# Patient Record
Sex: FEMALE | Race: BLACK OR AFRICAN AMERICAN | Employment: OTHER | URBAN - METROPOLITAN AREA
[De-identification: names, ages, dates, MRNs, and addresses within clinical notes are randomized per-mention and may not be internally consistent; named-entity substitution may affect disease eponyms.]

---

## 2017-11-27 ENCOUNTER — HOSPITAL ENCOUNTER (OUTPATIENT)
Dept: LAB | Age: 72
Discharge: HOME | End: 2017-11-27
Payer: MEDICARE

## 2017-11-27 DIAGNOSIS — I25.119 CORONARY ARTERY DISEASE INVOLVING NATIVE CORONARY ARTERY OF NATIVE HEART WITH ANGINA PECTORIS (HCC): ICD-10-CM

## 2017-11-27 PROBLEM — E78.2 HYPERLIPEMIA, MIXED: Status: ACTIVE | Noted: 2017-11-27

## 2017-11-27 PROBLEM — J43.8 OTHER EMPHYSEMA (HCC): Status: ACTIVE | Noted: 2017-11-27

## 2017-11-27 PROBLEM — M32.8 OTHER FORMS OF SYSTEMIC LUPUS ERYTHEMATOSUS (HCC): Status: ACTIVE | Noted: 2017-11-27

## 2017-11-27 PROBLEM — I10 ESSENTIAL HYPERTENSION: Status: ACTIVE | Noted: 2017-11-27

## 2017-11-27 PROBLEM — Z72.0 TOBACCO ABUSE: Status: ACTIVE | Noted: 2017-11-27

## 2017-11-27 LAB
ANION GAP SERPL CALC-SCNC: 6 MMOL/L
BASOPHILS # BLD: 0 K/UL (ref 0–0.2)
BASOPHILS NFR BLD: 0 % (ref 0–2)
BUN SERPL-MCNC: 13 MG/DL (ref 8–23)
CALCIUM SERPL-MCNC: 9 MG/DL (ref 8.3–10.4)
CHLORIDE SERPL-SCNC: 106 MMOL/L (ref 98–107)
CO2 SERPL-SCNC: 28 MMOL/L (ref 21–32)
CREAT SERPL-MCNC: 1.1 MG/DL (ref 0.6–1)
DIFFERENTIAL METHOD BLD: ABNORMAL
EOSINOPHIL # BLD: 0.1 K/UL (ref 0–0.8)
EOSINOPHIL NFR BLD: 1 % (ref 0.5–7.8)
ERYTHROCYTE [DISTWIDTH] IN BLOOD BY AUTOMATED COUNT: 15.3 % (ref 11.9–14.6)
GLUCOSE SERPL-MCNC: 84 MG/DL (ref 65–100)
HCT VFR BLD AUTO: 48.9 % (ref 35.8–46.3)
HGB BLD-MCNC: 16.2 G/DL (ref 11.7–15.4)
INR PPP: 1.1 (ref 0.9–1.2)
LYMPHOCYTES # BLD: 3.4 K/UL (ref 0.5–4.6)
LYMPHOCYTES NFR BLD: 35 % (ref 13–44)
MCH RBC QN AUTO: 31.6 PG (ref 26.1–32.9)
MCHC RBC AUTO-ENTMCNC: 33.1 G/DL (ref 31.4–35)
MCV RBC AUTO: 95.5 FL (ref 79.6–97.8)
MONOCYTES # BLD: 0.7 K/UL (ref 0.1–1.3)
MONOCYTES NFR BLD: 8 % (ref 4–12)
NEUTS SEG # BLD: 5.5 K/UL (ref 1.7–8.2)
NEUTS SEG NFR BLD: 56 % (ref 43–78)
PLATELET # BLD AUTO: 236 K/UL (ref 150–450)
PMV BLD AUTO: 10.7 FL (ref 10.8–14.1)
POTASSIUM SERPL-SCNC: 4.3 MMOL/L (ref 3.5–5.1)
PROTHROMBIN TIME: 11.6 SEC (ref 9.6–12)
RBC # BLD AUTO: 5.12 M/UL (ref 4.05–5.25)
SODIUM SERPL-SCNC: 140 MMOL/L (ref 136–145)
WBC # BLD AUTO: 9.7 K/UL (ref 4.3–11.1)

## 2017-11-27 PROCEDURE — 36415 COLL VENOUS BLD VENIPUNCTURE: CPT | Performed by: INTERNAL MEDICINE

## 2017-11-27 PROCEDURE — 85025 COMPLETE CBC W/AUTO DIFF WBC: CPT | Performed by: INTERNAL MEDICINE

## 2017-11-27 PROCEDURE — 80048 BASIC METABOLIC PNL TOTAL CA: CPT | Performed by: INTERNAL MEDICINE

## 2017-11-27 PROCEDURE — 85610 PROTHROMBIN TIME: CPT | Performed by: INTERNAL MEDICINE

## 2017-11-28 NOTE — PROGRESS NOTES
Patient pre-assessment complete for [facility] poss with Dr Rylan Herrera scheduled for 17 at 9:30am, arrival time 7:30am. Patient verified using . Patient instructed to bring all home medications in labeled bottles on the day of procedure. NPO status reinforced. Patient informed to take a full dose aspirin 325mg  or 81 mg x 4 on the day of procedure. Instructed they can take all other medications excluding vitamins & supplements. Patient verbalizes understanding of all instructions & denies any questions at this time.

## 2017-11-29 ENCOUNTER — HOSPITAL ENCOUNTER (OUTPATIENT)
Dept: CARDIAC CATH/INVASIVE PROCEDURES | Age: 72
Discharge: HOME | End: 2017-11-30
Attending: INTERNAL MEDICINE | Admitting: INTERNAL MEDICINE
Payer: MEDICARE

## 2017-11-29 PROBLEM — Z98.61 POST PTCA: Status: ACTIVE | Noted: 2017-11-29

## 2017-11-29 LAB
ACT BLD: 323 SECS (ref 70–128)
ATRIAL RATE: 68 BPM
ATRIAL RATE: 69 BPM
CALCULATED P AXIS, ECG09: 75 DEGREES
CALCULATED P AXIS, ECG09: 76 DEGREES
CALCULATED R AXIS, ECG10: 56 DEGREES
CALCULATED R AXIS, ECG10: 70 DEGREES
CALCULATED T AXIS, ECG11: 74 DEGREES
CALCULATED T AXIS, ECG11: 78 DEGREES
DIAGNOSIS, 93000: NORMAL
DIAGNOSIS, 93000: NORMAL
P-R INTERVAL, ECG05: 146 MS
P-R INTERVAL, ECG05: 158 MS
Q-T INTERVAL, ECG07: 408 MS
Q-T INTERVAL, ECG07: 424 MS
QRS DURATION, ECG06: 72 MS
QRS DURATION, ECG06: 72 MS
QTC CALCULATION (BEZET), ECG08: 437 MS
QTC CALCULATION (BEZET), ECG08: 450 MS
VENTRICULAR RATE, ECG03: 68 BPM
VENTRICULAR RATE, ECG03: 69 BPM

## 2017-11-29 PROCEDURE — 77030004559 HC CATH ANGI DX SUPT CARD -B

## 2017-11-29 PROCEDURE — 77030004534 HC CATH ANGI DX INFN CARD -A

## 2017-11-29 PROCEDURE — 99153 MOD SED SAME PHYS/QHP EA: CPT

## 2017-11-29 PROCEDURE — 85347 COAGULATION TIME ACTIVATED: CPT

## 2017-11-29 PROCEDURE — 74011000258 HC RX REV CODE- 258: Performed by: INTERNAL MEDICINE

## 2017-11-29 PROCEDURE — 74011250636 HC RX REV CODE- 250/636

## 2017-11-29 PROCEDURE — 74011250636 HC RX REV CODE- 250/636: Performed by: INTERNAL MEDICINE

## 2017-11-29 PROCEDURE — 93572 IV DOP VEL&/PRESS C FLO EA: CPT

## 2017-11-29 PROCEDURE — C1769 GUIDE WIRE: HCPCS

## 2017-11-29 PROCEDURE — 99152 MOD SED SAME PHYS/QHP 5/>YRS: CPT

## 2017-11-29 PROCEDURE — 93571 IV DOP VEL&/PRESS C FLO 1ST: CPT

## 2017-11-29 PROCEDURE — 93005 ELECTROCARDIOGRAM TRACING: CPT | Performed by: INTERNAL MEDICINE

## 2017-11-29 PROCEDURE — C1887 CATHETER, GUIDING: HCPCS

## 2017-11-29 PROCEDURE — C1894 INTRO/SHEATH, NON-LASER: HCPCS

## 2017-11-29 PROCEDURE — 74011636320 HC RX REV CODE- 636/320: Performed by: INTERNAL MEDICINE

## 2017-11-29 PROCEDURE — C1874 STENT, COATED/COV W/DEL SYS: HCPCS

## 2017-11-29 PROCEDURE — 77030004558 HC CATH ANGI DX SUPR TORQ CARD -A

## 2017-11-29 PROCEDURE — C1725 CATH, TRANSLUMIN NON-LASER: HCPCS

## 2017-11-29 PROCEDURE — 74011250637 HC RX REV CODE- 250/637: Performed by: NURSE PRACTITIONER

## 2017-11-29 PROCEDURE — 93458 L HRT ARTERY/VENTRICLE ANGIO: CPT

## 2017-11-29 PROCEDURE — C1760 CLOSURE DEV, VASC: HCPCS

## 2017-11-29 PROCEDURE — 74011000250 HC RX REV CODE- 250: Performed by: INTERNAL MEDICINE

## 2017-11-29 PROCEDURE — 74011250637 HC RX REV CODE- 250/637: Performed by: INTERNAL MEDICINE

## 2017-11-29 PROCEDURE — 77030013687 HC GD NDL BARD -B

## 2017-11-29 PROCEDURE — 92928 PRQ TCAT PLMT NTRAC ST 1 LES: CPT

## 2017-11-29 RX ORDER — ADENOSINE 3 MG/ML
140 INJECTION, SOLUTION INTRAVENOUS
Status: DISCONTINUED | OUTPATIENT
Start: 2017-11-29 | End: 2017-11-29

## 2017-11-29 RX ORDER — GUAIFENESIN 100 MG/5ML
81 LIQUID (ML) ORAL DAILY
Status: DISCONTINUED | OUTPATIENT
Start: 2017-11-30 | End: 2017-11-30 | Stop reason: HOSPADM

## 2017-11-29 RX ORDER — HEPARIN SODIUM 200 [USP'U]/100ML
3 INJECTION, SOLUTION INTRAVENOUS CONTINUOUS
Status: DISCONTINUED | OUTPATIENT
Start: 2017-11-29 | End: 2017-11-29 | Stop reason: HOSPADM

## 2017-11-29 RX ORDER — MAG HYDROX/ALUMINUM HYD/SIMETH 200-200-20
30 SUSPENSION, ORAL (FINAL DOSE FORM) ORAL AS NEEDED
Status: DISCONTINUED | OUTPATIENT
Start: 2017-11-29 | End: 2017-11-29

## 2017-11-29 RX ORDER — ISOSORBIDE MONONITRATE 30 MG/1
30 TABLET, EXTENDED RELEASE ORAL DAILY
Status: DISCONTINUED | OUTPATIENT
Start: 2017-11-30 | End: 2017-11-30 | Stop reason: HOSPADM

## 2017-11-29 RX ORDER — ACETAMINOPHEN 325 MG/1
650 TABLET ORAL
Status: DISCONTINUED | OUTPATIENT
Start: 2017-11-29 | End: 2017-11-30 | Stop reason: HOSPADM

## 2017-11-29 RX ORDER — CLOPIDOGREL BISULFATE 75 MG/1
600 TABLET ORAL ONCE
Status: COMPLETED | OUTPATIENT
Start: 2017-11-29 | End: 2017-11-29

## 2017-11-29 RX ORDER — MORPHINE SULFATE 10 MG/ML
2 INJECTION, SOLUTION INTRAMUSCULAR; INTRAVENOUS
Status: DISCONTINUED | OUTPATIENT
Start: 2017-11-29 | End: 2017-11-30 | Stop reason: HOSPADM

## 2017-11-29 RX ORDER — GUAIFENESIN 100 MG/5ML
324 LIQUID (ML) ORAL ONCE
Status: DISCONTINUED | OUTPATIENT
Start: 2017-11-29 | End: 2017-11-29

## 2017-11-29 RX ORDER — PANTOPRAZOLE SODIUM 40 MG/1
40 TABLET, DELAYED RELEASE ORAL DAILY
Status: DISCONTINUED | OUTPATIENT
Start: 2017-11-30 | End: 2017-11-30 | Stop reason: HOSPADM

## 2017-11-29 RX ORDER — FLUTICASONE PROPIONATE 50 MCG
2 SPRAY, SUSPENSION (ML) NASAL
Status: DISCONTINUED | OUTPATIENT
Start: 2017-11-29 | End: 2017-11-30 | Stop reason: HOSPADM

## 2017-11-29 RX ORDER — METOPROLOL TARTRATE 50 MG/1
50 TABLET ORAL 2 TIMES DAILY
Status: DISCONTINUED | OUTPATIENT
Start: 2017-11-29 | End: 2017-11-30 | Stop reason: HOSPADM

## 2017-11-29 RX ORDER — CLOPIDOGREL BISULFATE 75 MG/1
75 TABLET ORAL DAILY
Status: DISCONTINUED | OUTPATIENT
Start: 2017-11-29 | End: 2017-11-30 | Stop reason: HOSPADM

## 2017-11-29 RX ORDER — SODIUM CHLORIDE 9 MG/ML
75 INJECTION, SOLUTION INTRAVENOUS CONTINUOUS
Status: DISCONTINUED | OUTPATIENT
Start: 2017-11-29 | End: 2017-11-29

## 2017-11-29 RX ORDER — GABAPENTIN 300 MG/1
300 CAPSULE ORAL 2 TIMES DAILY
Status: DISCONTINUED | OUTPATIENT
Start: 2017-11-29 | End: 2017-11-30 | Stop reason: HOSPADM

## 2017-11-29 RX ORDER — LIDOCAINE HYDROCHLORIDE 20 MG/ML
60 INJECTION, SOLUTION INFILTRATION; PERINEURAL ONCE
Status: COMPLETED | OUTPATIENT
Start: 2017-11-29 | End: 2017-11-29

## 2017-11-29 RX ORDER — ATORVASTATIN CALCIUM 40 MG/1
40 TABLET, FILM COATED ORAL DAILY
Status: DISCONTINUED | OUTPATIENT
Start: 2017-11-30 | End: 2017-11-29 | Stop reason: SDUPTHER

## 2017-11-29 RX ORDER — LORATADINE 10 MG/1
10 TABLET ORAL
Status: DISCONTINUED | OUTPATIENT
Start: 2017-11-29 | End: 2017-11-30 | Stop reason: HOSPADM

## 2017-11-29 RX ORDER — SODIUM CHLORIDE 0.9 % (FLUSH) 0.9 %
5-10 SYRINGE (ML) INJECTION AS NEEDED
Status: DISCONTINUED | OUTPATIENT
Start: 2017-11-29 | End: 2017-11-30 | Stop reason: HOSPADM

## 2017-11-29 RX ORDER — HYDROCODONE BITARTRATE AND ACETAMINOPHEN 5; 325 MG/1; MG/1
1 TABLET ORAL
Status: DISCONTINUED | OUTPATIENT
Start: 2017-11-29 | End: 2017-11-30 | Stop reason: HOSPADM

## 2017-11-29 RX ORDER — FENTANYL CITRATE 50 UG/ML
25-50 INJECTION, SOLUTION INTRAMUSCULAR; INTRAVENOUS
Status: DISCONTINUED | OUTPATIENT
Start: 2017-11-29 | End: 2017-11-29 | Stop reason: HOSPADM

## 2017-11-29 RX ORDER — NITROGLYCERIN 0.4 MG/1
0.4 TABLET SUBLINGUAL
Status: DISCONTINUED | OUTPATIENT
Start: 2017-11-29 | End: 2017-11-30 | Stop reason: HOSPADM

## 2017-11-29 RX ORDER — MIDAZOLAM HYDROCHLORIDE 1 MG/ML
.5-2 INJECTION, SOLUTION INTRAMUSCULAR; INTRAVENOUS
Status: DISCONTINUED | OUTPATIENT
Start: 2017-11-29 | End: 2017-11-29 | Stop reason: HOSPADM

## 2017-11-29 RX ORDER — ONDANSETRON 2 MG/ML
4 INJECTION INTRAMUSCULAR; INTRAVENOUS ONCE
Status: COMPLETED | OUTPATIENT
Start: 2017-11-29 | End: 2017-11-29

## 2017-11-29 RX ORDER — ATORVASTATIN CALCIUM 20 MG/1
40 TABLET, FILM COATED ORAL DAILY
Status: DISCONTINUED | OUTPATIENT
Start: 2017-11-30 | End: 2017-11-30 | Stop reason: HOSPADM

## 2017-11-29 RX ORDER — FLUTICASONE PROPIONATE 50 MCG
2 SPRAY, SUSPENSION (ML) NASAL DAILY
Status: DISCONTINUED | OUTPATIENT
Start: 2017-11-30 | End: 2017-11-29

## 2017-11-29 RX ORDER — IPRATROPIUM BROMIDE AND ALBUTEROL SULFATE 2.5; .5 MG/3ML; MG/3ML
3 SOLUTION RESPIRATORY (INHALATION)
Status: DISCONTINUED | OUTPATIENT
Start: 2017-11-29 | End: 2017-11-30 | Stop reason: HOSPADM

## 2017-11-29 RX ORDER — DIAZEPAM 5 MG/1
5 TABLET ORAL ONCE
Status: DISCONTINUED | OUTPATIENT
Start: 2017-11-29 | End: 2017-11-29

## 2017-11-29 RX ORDER — SODIUM CHLORIDE 0.9 % (FLUSH) 0.9 %
5-10 SYRINGE (ML) INJECTION EVERY 8 HOURS
Status: DISCONTINUED | OUTPATIENT
Start: 2017-11-29 | End: 2017-11-30 | Stop reason: HOSPADM

## 2017-11-29 RX ORDER — SODIUM CHLORIDE 9 MG/ML
75 INJECTION, SOLUTION INTRAVENOUS CONTINUOUS
Status: DISCONTINUED | OUTPATIENT
Start: 2017-11-29 | End: 2017-11-30 | Stop reason: HOSPADM

## 2017-11-29 RX ORDER — TRAMADOL HYDROCHLORIDE 50 MG/1
50 TABLET ORAL
Status: DISCONTINUED | OUTPATIENT
Start: 2017-11-29 | End: 2017-11-30 | Stop reason: HOSPADM

## 2017-11-29 RX ADMIN — FENTANYL CITRATE 25 MCG: 50 INJECTION, SOLUTION INTRAMUSCULAR; INTRAVENOUS at 08:39

## 2017-11-29 RX ADMIN — BIVALIRUDIN 1.75 MG/KG/HR: 250 INJECTION, POWDER, LYOPHILIZED, FOR SOLUTION INTRAVENOUS at 09:03

## 2017-11-29 RX ADMIN — ALUMINUM HYDROXIDE, MAGNESIUM HYDROXIDE, AND SIMETHICONE 30 ML: 200; 200; 20 SUSPENSION ORAL at 09:47

## 2017-11-29 RX ADMIN — ADENOSINE 140 MCG/KG/MIN: 3 INJECTION, SOLUTION INTRAVENOUS at 09:02

## 2017-11-29 RX ADMIN — CLOPIDOGREL BISULFATE 75 MG: 75 TABLET ORAL at 15:01

## 2017-11-29 RX ADMIN — MIDAZOLAM HYDROCHLORIDE 1 MG: 1 INJECTION, SOLUTION INTRAMUSCULAR; INTRAVENOUS at 09:31

## 2017-11-29 RX ADMIN — METOPROLOL TARTRATE 50 MG: 50 TABLET ORAL at 18:12

## 2017-11-29 RX ADMIN — TRAMADOL HYDROCHLORIDE 50 MG: 50 TABLET ORAL at 20:47

## 2017-11-29 RX ADMIN — GABAPENTIN 300 MG: 300 CAPSULE ORAL at 18:13

## 2017-11-29 RX ADMIN — Medication 2 SPRAY: at 21:11

## 2017-11-29 RX ADMIN — Medication 10 ML: at 15:04

## 2017-11-29 RX ADMIN — IOPAMIDOL 220 ML: 755 INJECTION, SOLUTION INTRAVENOUS at 09:47

## 2017-11-29 RX ADMIN — HYDROCODONE BITARTRATE AND ACETAMINOPHEN 1 TABLET: 5; 325 TABLET ORAL at 15:04

## 2017-11-29 RX ADMIN — MIDAZOLAM HYDROCHLORIDE 1 MG: 1 INJECTION, SOLUTION INTRAMUSCULAR; INTRAVENOUS at 09:29

## 2017-11-29 RX ADMIN — SODIUM CHLORIDE 75 ML/HR: 900 INJECTION, SOLUTION INTRAVENOUS at 08:00

## 2017-11-29 RX ADMIN — FENTANYL CITRATE 25 MCG: 50 INJECTION, SOLUTION INTRAMUSCULAR; INTRAVENOUS at 09:05

## 2017-11-29 RX ADMIN — NITROGLYCERIN 0.2 MG: 200 INJECTION, SOLUTION INTRAVENOUS at 09:20

## 2017-11-29 RX ADMIN — CLOPIDOGREL BISULFATE 600 MG: 75 TABLET ORAL at 09:32

## 2017-11-29 RX ADMIN — LIDOCAINE HYDROCHLORIDE 60 MG: 20 INJECTION, SOLUTION INFILTRATION; PERINEURAL at 08:45

## 2017-11-29 RX ADMIN — MIDAZOLAM HYDROCHLORIDE 2 MG: 1 INJECTION, SOLUTION INTRAMUSCULAR; INTRAVENOUS at 08:39

## 2017-11-29 RX ADMIN — FENTANYL CITRATE 25 MCG: 50 INJECTION, SOLUTION INTRAMUSCULAR; INTRAVENOUS at 09:30

## 2017-11-29 RX ADMIN — LORATADINE 10 MG: 10 TABLET ORAL at 21:10

## 2017-11-29 RX ADMIN — ONDANSETRON 4 MG: 2 INJECTION INTRAMUSCULAR; INTRAVENOUS at 10:44

## 2017-11-29 RX ADMIN — MIDAZOLAM HYDROCHLORIDE 1 MG: 1 INJECTION, SOLUTION INTRAMUSCULAR; INTRAVENOUS at 09:05

## 2017-11-29 RX ADMIN — SODIUM CHLORIDE 75 ML/HR: 900 INJECTION, SOLUTION INTRAVENOUS at 12:14

## 2017-11-29 RX ADMIN — HEPARIN SODIUM 3 ML/HR: 200 INJECTION, SOLUTION INTRAVENOUS at 09:00

## 2017-11-29 NOTE — PROGRESS NOTES
TRANSFER - IN REPORT:    Verbal report received from [facility] on Herschell Babin being received from Cath Lab for routine progression of care. Report consisted of patients Situation, Background, Assessment and Recommendations(SBAR). Information from the following report(s)SBAR was reviewed. Patient had a left heart cath via right groin. Angiomaxx off at [phone]. Patient On Bedrest for 3 more hrs. Patient voiding. Opportunity for questions and clarification was provided. Assessment completed upon patients arrival to unit and care assumed. Patient received to room 329. Patient connected to monitor and assessment completed. Plan of care reviewed. Patient oriented to room and call light. Patient aware to use call light to communicate any chest pain or needs. Admission skin assessment completed with second RN and reveals the following: skin intact without redness or open areas to include sacrum and heels bilaterally.

## 2017-11-29 NOTE — PROGRESS NOTES
TRANSFER - OUT REPORT:    Verbal report given to tele(name) on Ted Henao  being transferred to [facility](unit) for routine progression of care       Report consisted of patients Situation, Background, Assessment and   Recommendations(SBAR). Information from the following report(s) Procedure Summary was reviewed with the receiving nurse. Lines:   Peripheral IV 11/29/17 Left Antecubital (Active)        Opportunity for questions and clarification was provided.       Patient transported with:   [facility]

## 2017-11-29 NOTE — PROGRESS NOTES
TRANSFER - OUT REPORT:    Verbal report given to Josh(name) on [place]  being transferred to [facility](unit) for routine progression of care       Report consisted of patients Situation, Background, Assessment and   Recommendations(SBAR). Information from the following report(s) SBAR was reviewed with the receiving nurse. Opportunity for questions and clarification was provided. Procedure: [facility]   Finding Summary: stent x 1 to RCA(cath/pci/pacer settings)  Location: r groin   Closure Device: Perclose/direct pressure(yes/no/description)  Post Site Assessment: no bleeding/no hematoma        Intra Procedure Meds:    Versed: 5mg  Fentanyl: 75mcg  Angiomax: 11ml bolus, 26ml/hr infusion  Angiomax Stop Time: 3009  Adenonsine: intra-procedure for pressure wire FFR. 140mcg/kg/min   Antiplatelet: 625YS Plavix  Mylanta 30ml           Peripheral IV 11/29/17 Left Antecubital (Active)        Post-Procedure Site Assessment (1)  Wound Type: Catheter entry/exit  Location: Groin  Orientation : Right  Hemostasis : Manual compresssion  Closure Device: Perclose  Site Assessment: No bleeding, No hematoma                       is allergic to bactrim [sulfamethoprim]; penicillins; and sulfa (sulfonamide antibiotics).     Past Medical History:   Diagnosis Date    Arthritis     CAD (coronary artery disease)     MI 8/2017    Essential hypertension 11/27/2017    GERD (gastroesophageal reflux disease)     Hyperlipemia, mixed 11/27/2017    Other emphysema ([facility] [address] 11/27/2017    Other forms of systemic lupus erythematosus ([facility] [address] 11/27/2017    PUD (peptic ulcer disease)     Tobacco abuse 11/27/2017     Visit Vitals    /70 (BP Patient Position: Supine)    Pulse 72    Temp 98.5 °F (36.9 °C)    Resp 14    Ht 5' 4\" (1.626 m)    Wt 73.5 kg (162 lb)    SpO2 96%    Breastfeeding No    BMI 27.81 kg/m2

## 2017-11-29 NOTE — PROCEDURES
[address]ook 46       Name:  Nae Bird   MR#:  [id number]   :  1945   Account #:  [de-identified]   Date of Adm:  2017       DATE OF PROCEDURE: 2017. PROCEDURES: Left heart catheterization, selective coronary   arteriography, left ventriculogram via the right femoral artery. INDICATION: Known coronary artery disease based on   catheterization performed at an outside facility. The patient   was referred for consideration of coronary artery bypass   grafting. Review of the angiographic films raised the   possibility that her LAD was not hemodynamically significant,   and given her ongoing symptoms, repeat cardiac catheterization   with hemodynamic assessment was requested. She currently has   [place] Cardiovascular class 4 anginal symptoms as she has   recurrent chest pain requiring nitroglycerin. She does have a   high-grade RCA stenosis. TECHNICAL FACTORS: After informed consent was obtained, the   patient was brought to her catheterization lab. The right   femoral artery was prepped and draped in the usual sterile   fashion. Using a [employer] ultrasound, the right common femoral   artery was identified. Under ultrasound guidance, the right   common femoral artery was entered. A 6-Uzbek arterial sheath   was placed without difficulty using a modified Seldinger   technique. At this point, a JL4 catheter was used to selectively   engage the ostium of the left main coronary artery and a 3DRC   catheter was used to selectively engage the ostium of the right   coronary artery. Selective injections in various projections   were performed. A pigtail catheter was used to cross the aortic   valve and enter the left ventricle. Hemodynamic measurements and   left ventriculogram were obtained. Left ventricular aortic   pressure gradient obtained by pullback technique.     At the conclusion of diagnostic procedure, the patient was   referred for hemodynamic assessment of her LAD and diagonal and   later PCI of the RCA. Please see procedure note for details. SEDATION: The patient received 5 mg of Versed and 75 mcg of   fentanyl. Sedation was administered by Sergio Burr RN under my   supervision. Sedation start time was 8:33 with a procedure   completion of 9:45. CONTRAST: Isovue 220. HEMODYNAMIC RESULTS   1. Aortic pressure of 147/81. 2. Left ventricular end-diastolic pressure is 23.   3. There was no significant gradient across the aortic valve. ANGIOGRAPHIC RESULTS   1. Left main coronary artery: Has eccentric calcification in the   ostium with a 30% stenosis. There is good reflux of dye with no   dampening upon engagement. 2. LAD: Medium caliber vessel. Has diffuse mid disease with   approximately 40%-50% eccentric stenosis. The distal vessel is   patent. 3. First diagonal artery: A small caliber vessel. Contains a   smooth 60% proximal stenosis. 4. Left circumflex is a medium caliber nondominant vessel. It is   very tortuous in its course. The left circumflex is patent. 5. First obtuse marginal artery: Medium caliber vessel. There is   a stent in the proximal portion which is patent. The mid portion   is very tortuous and appears to contain a 50% eccentric   stenosis. The distal portion of the vessel is aneurysmal. It   supplies 2 small terminal branches. 6. Right coronary artery is a medium caliber dominant vessel. Tortuous vessel with moderate calcification, 30% proximal   stenosis, 30% mid stenosis, and high-grade 80% distal stenosis   as noted. 7. Right posterolateral branch: Medium caliber vessel. Patent. 8. Right PDA: Small caliber vessel. Patent. 9. Left ventriculogram performed in the [place] projection shows   normal left ventricular systolic function with EF 50%-55%. Focal   mid mild hypokinesis. Aortic root is nondilated. CONCLUSIONS   1. Multivessel coronary artery disease as outlined above.    2. Normal left ventricular systolic function with segmental wall   motion consistent with ischemic cardiomyopathy. PLAN: We will proceed with hemodynamic assessment of LAD and   diagonal and potential PCI of the RCA. Please see procedure note   for details. PERCUTANEOUS CORONARY INTERVENTION   LESION: Mid LAD and mid diagonal.   TECHNICAL FACTORS: The patient was given intravenous Angiomax. An XB 3.5, 6-Slovenian guide was used to selectively engage the   ostium of the left main coronary artery. A Verrata pressure wire   system was advanced into the ostium of the left main coronary   artery. This was appropriately normalized. The patient was given   intravenous adenosine at 140 mcg/kg per minute and the Verrata   wire was placed in the distal LAD. After 2 minutes there was an   expected hemodynamic response. FFR was measured in the distal   LAD at 0.86, indicating a hemodynamically insignificant   stenosis. The Verrata wire was then repositioned in the distal   first diagonal and the FFR is 0.89, both indicating   hemodynamically insignificant stenosis. The patient has an eccentric stenosis of the circumflex which   does not appear to be obstructive. It appears the patient has   single vessel disease with high-grade stenosis of her distal   RCA. Given her ongoing symptoms despite 2 antianginal   medications, we will proceed with PCI. PERCUTANEOUS CORONARY INTERVENTION   LESION: Distal RCA. Prestenosis 80%, post-stenosis 0%. TECHNICAL FACTORS: The patient had received intravenous   Angiomax. A 6-Slovenian ART 3.5 guide was used to selectively   engage the ostium of the right coronary artery. A Whisper extra   support wire was positioned distal to the stenosis. The stenosis   was predilated with a 2.25 x 12 mm Emerge balloon to 15   atmospheres. At this point, a Synergy 2.5 x 16 mm drug-eluting   stent was positioned and deployed at 16 atmospheres.  Following   stent deployment, a TREK NC 2.5 x 12 mm balloon was positioned and deployed on 2 separate occasions to 18 atmospheres. Following post dilatation, there was excellent angiographic   result with no residual stenosis. The wire was removed and 200   mcg of intracoronary nitroglycerin was administered. CONCLUSION: Successful percutaneous coronary intervention and   stenting of the distal right coronary artery with a 2.5 x 16 mm   Synergy drug-eluting stent. Normal FFR of LAD and Diagonal.     PLAN: Ongoing medical therapy and aggressive risk factor   modification.         MD EVELYN Martínez / [facility]   D:  11/29/2017   17:39   T:  11/29/2017   18:16   Job #:  [id number]

## 2017-11-29 NOTE — IP AVS SNAPSHOT
[address] [id number] 
[phone] Patient: Polo Pardo MRN: [id number] :1945 About your hospitalization You were admitted on:  2017 You last received care in the:  [facility] 3 CLINICAL OBSERVATION You were discharged on:  2017 Why you were hospitalized Your primary diagnosis was:  Not on File Your diagnoses also included:  Post Ptca Things You Need To Do (next 8 weeks) Follow up with Rosa Smith MD  
  
Where:  Patient can only remember the practice name and not the physician Follow up with Joyce Peck MD  
 at [address] [facility] office Phone:  [phone] Where:  [address], [address] [place] [id number] Tuesday Dec 12, 2017 ORIENTATION with Yuly Peña RN at  2:30 PM  
Where:  [facility] ([address] [facility]) Follow up with [facility] Please arrive at 2:15pm to sign in with Registration on the first floor before coming to your appointment on the second floor. Phone:  [phone] Where:  [address] [facility], [address] [facility] Discharge Orders Procedure Order Date Status Priority Quantity Spec Type Associated Dx REFERRAL TO CARDIAC REHAB 17 0734 Normal Routine 1 A check preeti indicates which time of day the medication should be taken. My Medications STOP taking these medications   
 aspirin delayed-release 81 mg tablet Replaced by:  aspirin 81 mg chewable tablet TAKE these medications as instructed Instructions Each Dose to Equal  
 Morning Noon Evening Bedtime  
 albuterol-ipratropium 2.5 mg-0.5 mg/3 ml Nebu Commonly known as:  Wyn Layer Your next dose is:  As needed 3 mL by Nebulization route as needed. 3 mL  
    
   
   
   
  
 aspirin 81 mg chewable tablet Your next dose is:  2017 Take 1 Tab by mouth daily. 81 mg  
    
   
   
   
  
 atorvastatin 40 mg tablet Commonly known as:  LIPITOR Your next dose is:  12-1-2017 Take 1 Tab by mouth daily. 40 mg  
    
   
   
   
  
 clopidogrel 75 mg Tab Commonly known as:  PLAVIX Your next dose is:  12-1-2017 Take 1 Tab by mouth daily. 75 mg FLONASE 50 mcg/actuation nasal spray Generic drug:  fluticasone 2 Sprays by Both Nostrils route daily. 2 Spray  
    
   
   
   
  
 gabapentin 300 mg capsule Commonly known as:  NEURONTIN Your next dose is:  11- Take 300 mg by mouth two (2) times a day. 300 mg  
    
   
   
  
   
  
 IMDUR 30 mg tablet Generic drug:  isosorbide mononitrate ER Your next dose is:  12-1-2017 Take  by mouth daily. metoprolol tartrate 50 mg tablet Commonly known as:  LOPRESSOR Your next dose is:  11- Take  by mouth two (2) times a day. nitroglycerin 0.4 mg SL tablet Commonly known as:  NITROSTAT Your next dose is:  As needed  
   
 by SubLINGual route every five (5) minutes as needed for Chest Pain. PROTONIX 40 mg tablet Generic drug:  pantoprazole Your next dose is:  12-1-2017 Take 40 mg by mouth daily. 40 mg  
    
   
   
   
  
 traMADol 50 mg tablet Commonly known as:  ULTRAM  
Your next dose is:  As needed Take 50 mg by mouth every six (6) hours as needed for Pain. 50 mg Where to Get Your Medications These medications were sent to [facility] [address] [facility] [place] [address] [facility] Phone:  [phone]  
  atorvastatin 40 mg tablet  
 clopidogrel 75 mg Tab Discharge Instructions Cardiac Catheterization/Angiography Discharge Instructions *Check the puncture site frequently for swelling or bleeding. If you see any bleeding, lie down and apply pressure over the area with a clean town or washcloth. Notify your doctor for any redness, swelling, drainage or oozing from the puncture site. Notify your doctor for any fever or chills. *If the leg or arm with the puncture becomes cold, numb or painful, call [facility] Cardiology at  [phone]. *Activity should be limited for the next 48 hours. Climb stairs as little as possible and avoid any stooping, bending or strenuous activity for 48 hours. No heavy lifting (anything over 10 pounds) for three days. *Do not drive for 48 hours. *You may resume your usual diet. Drink more fluids than usual. 
 
*Have a responsible person drive you home and stay with you for at least 24 hours after your heart catheterization/angiography. *You may remove the bandage from your Right and Groin in 24 hours. You may shower in 24 hours. No tub baths, hot tubs or swimming for one week. Do not place any lotions, creams, powders, ointments over the puncture site for one week. You may place a clean band-aid over the puncture site each day for 5 days. Change this daily. Percutaneous Coronary Intervention: What to Expect at [facility] Your Recovery Percutaneous coronary intervention (PCI) is the name for procedures that are used to open a narrowed or blocked coronary artery. The two most common PCI procedures are coronary angioplasty and coronary stent placement. Your groin or arm may have a bruise and feel sore for a day or two after a percutaneous coronary intervention (PCI). You can do light activities around the house, but nothing strenuous for several days. This care sheet gives you a general idea about how long it will take for you to recover. But each person recovers at a different pace. Follow the steps below to get better as quickly as possible. How can you care for yourself at home? Activity ? · If the doctor gave you a sedative: ¨ For 24 hours, don't do anything that requires attention to detail. It takes time for the medicine's effects to completely wear off. ¨ For your safety, do not drive or operate any machinery that could be dangerous. Wait until the medicine wears off and you can think clearly and react easily. ? · Do not do strenuous exercise and do not lift, pull, or push anything heavy until your doctor says it is okay. This may be for a day or two. You can walk around the house and do light activity, such as cooking. ? · If the catheter was placed in your groin, try not to walk up stairs for the first couple of days. ? · If the catheter was placed in your arm near your wrist, do not bend your wrist deeply for the first couple of days. Be careful using your hand to get into and out of a chair or bed. ? · Carry your stent identification card with you at all times. ? · If your doctor recommends it, get more exercise. Walking is a good choice. Bit by bit, increase the amount you walk every day. Try for at least 30 minutes on most days of the week. Diet ? · Drink plenty of fluids to help your body flush out the dye. If you have kidney, heart, or liver disease and have to limit fluids, talk with your doctor before you increase the amount of fluids you drink. ? · Keep eating a heart-healthy diet that has lots of fruits, vegetables, and whole grains. If you have not been eating this way, talk to your doctor. You also may want to talk to a dietitian. This expert can help you to learn about healthy foods and plan meals. Medicines ? · Your doctor will tell you if and when you can restart your medicines. He or she will also give you instructions about taking any new medicines. ? · If you take blood thinners, such as warfarin (Coumadin), clopidogrel (Plavix), or aspirin, be sure to talk to your doctor.  He or she will tell you if and when to start taking those medicines again. Make sure that you understand exactly what your doctor wants you to do.  
? · Your doctor will prescribe blood-thinning medicines. You will likely take aspirin plus another antiplatelet, such as clopidogrel (Plavix). It is very important that you take these medicines exactly as directed. These medicines help keep the coronary artery open and reduce your risk of a heart attack. ? · Call your doctor if you think you are having a problem with your medicine. ?Care of the catheter site ? · For 1 or 2 days, keep a bandage over the spot where the catheter was inserted. The bandage probably will fall off in this time. ? · Put ice or a cold pack on the area for 10 to 20 minutes at a time to help with soreness or swelling. Put a thin cloth between the ice and your skin. ? · You may shower 24 to 48 hours after the procedure, if your doctor okays it. Pat the incision dry. ? · Do not soak the catheter site until it is healed. Don't take a bath for 1 week, or until your doctor tells you it isokay. Follow-up care is a key part of your treatment and safety. Be sure to make and go to all appointments, and call your doctor if you are having problems. It's also a good idea to know your test results and keep a list of the medicines you take. When should you call for help? Call 911 anytime you think you may need emergency care. For example, call if: 
? · You passed out (lost consciousness). ? · You have severe trouble breathing. ? · You have sudden chest pain and shortness of breath, or you cough up blood. ? · You have symptoms of a heart attack, such as: ¨ Chest pain or pressure. ¨ Sweating. ¨ Shortness of breath. ¨ Nausea or vomiting. ¨ Pain that spreads from the chest to the neck, jaw, or one or both shoulders or arms. ¨ Dizziness or lightheadedness. ¨ A fast or uneven pulse. After calling 911, chew 1 adult-strength aspirin. Wait for an ambulance. Do not try to drive yourself. ? · You have been diagnosed with angina, and you have angina symptoms that do not go away with rest or are not getting better within 5 minutes after you take one dose of nitroglycerin. ?Call your doctor now or seek immediate medical care if: 
? · You are bleeding from the area where the catheter was put in your artery. ? · You have a fast-growing, painful lump at the catheter site. ? · You have signs of infection, such as: 
¨ Increased pain, swelling, warmth, or redness. ¨ Red streaks leading from the catheter site. ¨ Pus draining from the catheter site. ¨ A fever. ? · Your leg or arm looks blue or feels cold, numb, or tingly. ? Watch closely for changes in your health, and be sure to contact your doctor if you have any problems. Where can you learn more? Go to http://piyush-bárbara.info/. Enter T227 in the search box to learn more about \"Percutaneous Coronary Intervention: What to Expect at Home. \" Current as of: September 21, 2016 Content Version: 11.4 © 0397-7604 [employer]. Care instructions adapted under license by [employer] (which disclaims liability or warranty for this information). If you have questions about a medical condition or this instruction, always ask your healthcare professional. [place] [address] any warranty or liability for your use of this information. Endorphin Announcement We are excited to announce that we are making your provider's discharge notes available to you in Endorphin. You will see these notes when they are completed and signed by the physician that discharged you from your recent hospital stay. If you have any questions or concerns about any information you see in Endorphin, please call the Health Information Department where you were seen or reach out to your Primary Care Provider for more information about your plan of care. Introducing [facility] SERVICES! Dallas Rose introduces FIZZA patient portal. Now you can access parts of your medical record, email your doctor's office, and request medication refills online. 1. In your internet browser, go to [email or web address] 2. Click on the First Time User? Click Here link in the Sign In box. You will see the New Member Sign Up page. 3. Enter your FIZZA Access Code exactly as it appears below. You will not need to use this code after youve completed the sign-up process. If you do not sign up before the expiration date, you must request a new code. · FIZZA Access Code: [email or web address] Expires: 2/20/2018  3:11 PM 
 
4. Enter the last four digits of your Social Security Number (xxxx) and Date of Birth (mm/dd/yyyy) as indicated and click Submit. You will be taken to the next sign-up page. 5. Create a FIZZA ID. This will be your FIZZA login ID and cannot be changed, so think of one that is secure and easy to remember. 6. Create a FIZZA password. You can change your password at any time. 7. Enter your Password Reset Question and Answer. This can be used at a later time if you forget your password. 8. Enter your e-mail address. You will receive e-mail notification when new information is available in [address] 9. Click Sign Up. You can now view and download portions of your medical record. 10. Click the Download Summary menu link to download a portable copy of your medical information. If you have questions, please visit the Frequently Asked Questions section of the FIZZA website. Remember, FIZZA is NOT to be used for urgent needs. For medical emergencies, dial 911. Now available from your iPhone and Android! Unresulted Labs-Please follow up with your PCP about these lab tests Order Current Status EKG, 12 LEAD, INITIAL Preliminary result Providers Seen During Your Hospitalization Provider Specialty Primary office phone Enzo Keen MD Cardiology [phone] Your Primary Care Physician (PCP) Primary Care Physician Office Phone Office Fax OTHER, PHYS ** None ** ** None ** You are allergic to the following Allergen Reactions Bactrim (Sulfamethoprim) Rash Penicillins Rash  
    
 Sulfa (Sulfonamide Antibiotics) Rash Recent Documentation Height Weight Breastfeeding? BMI OB Status Smoking Status 1.626 m 73.4 kg No 27.77 kg/m2 Hysterectomy Current Every Day Smoker Emergency Contacts Name Discharge Info Relation Home Work Mobile Niyah Durand  Other Relative [6] [phone] Patient Belongings The following personal items are in your possession at time of discharge: 
     Visual Aid: Glasses, At bedside             Clothing: Pants, Shirt, Undergarments, With patient Discharge Instructions Attachments/References CARDIAC REHABILITATION (ENGLISH) Patient Handouts Cardiac Rehabilitation: Care Instructions Your Care Instructions Cardiac rehabilitation is a program for people who have a heart problem, such as a heart attack, heart failure, or a heart valve disease. The program includes exercise, lifestyle changes, education, and emotional support. Cardiac rehab can help you improve the quality of your life through better overall health. It can help you lose weight and feel better about yourself. On your cardiac rehab team, you may have your doctor, a nurse specialist, a dietitian, and a physical therapist. They will design your cardiac rehab program specifically for you. You will learn how to reduce your risk for heart problems, how to manage stress, and how to eat a heart-healthy diet. By the end of the program, you will be ready to maintain a healthier lifestyle on your own. Follow-up care is a key part of your treatment and safety.  Be sure to make and go to all appointments, and call your doctor if you are having problems. It's also a good idea to know your test results and keep a list of the medicines you take. How can you care for yourself at home? · Take your medicines exactly as prescribed. Call your doctor if you think you are having a problem with your medicine. You will get more details on the specific medicines your doctor prescribes. · Weigh yourself every day if your doctor tells you to. Watch for sudden weight gain. Weigh yourself on the same scale with the same amount of clothing at the same time of day. · Plan your meals so that you are eating heart-healthy foods. ¨ Eat a variety of foods daily. Fresh fruits and vegetables and whole-grains are good choices. ¨ Limit your fat intake, especially saturated and trans fat. ¨ Limit salt (sodium). ¨ Increase fiber in your diet. ¨ Limit alcohol. · Learn how to take your pulse so that you can track your heart rate during exercise. · Always check with your doctor before you begin a new exercise program. 
· Warm up before you exercise and cool down afterward for at least 15 minutes each. This will help your heart gradually prepare for and recover from exercise and avoid pushing your heart too hard. · Stop exercising if you have any unusual discomfort, such as chest pain. · Do not smoke. Smoking can make heart problems worse. If you need help quitting, talk to your doctor about stop-smoking programs and medicines. These can increase your chances of quitting for good. When should you call for help? Call 911 anytime you think you may need emergency care. For example, call if: 
? · You have severe trouble breathing. ? · You cough up pink, foamy mucus and you have trouble breathing. ? · You have symptoms of a heart attack. These may include: ¨ Chest pain or pressure, or a strange feeling in the chest. 
¨ Sweating. ¨ Shortness of breath. ¨ Nausea or vomiting.  
¨ Pain, pressure, or a strange feeling in the back, neck, jaw, or upper belly or in one or both shoulders or arms. ¨ Lightheadedness or sudden weakness. ¨ A fast or irregular heartbeat. After you call 911, the  may tell you to chew 1 adult-strength or 2 to 4 low-dose aspirin. Wait for an ambulance. Do not try to drive yourself. ? · You have angina symptoms (such as chest pain or pressure) that do not go away with rest or are not getting better within 5 minutes after you take a dose of nitroglycerin. ? · You have symptoms of a stroke. These may include: 
¨ Sudden numbness, tingling, weakness, or loss of movement in your face, arm, or leg, especially on only one side of your body. ¨ Sudden vision changes. ¨ Sudden trouble speaking. ¨ Sudden confusion or trouble understanding simple statements. ¨ Sudden problems with walking or balance. ¨ A sudden, severe headache that is different from past headaches. ? · You passed out (lost consciousness). ?Call your doctor now or seek immediate medical care if: 
? · You have new or increased shortness of breath. ? · You are dizzy or lightheaded, or you feel like you may faint. ? · You gain weight suddenly, such as more than 2 to 3 pounds in a day or 5 pounds in a week. (Your doctor may suggest a different range of weight gain.) ? · You have increased swelling in your legs, ankles, or feet. ? Watch closely for changes in your health, and be sure to contact your doctor if you have any problems. Where can you learn more? Go to http://piyush-bárbara.info/. Enter J792 in the search box to learn more about \"Cardiac Rehabilitation: Care Instructions. \" Current as of: September 21, 2016 Content Version: 11.4 © 1958-2267 [employer]. Care instructions adapted under license by [employer] (which disclaims liability or warranty for this information).  If you have questions about a medical condition or this instruction, always ask your healthcare professional. Em[employer] disclaims any warranty or liability for your use of this information. Please provide this summary of care documentation to your next provider. Signatures-by signing, you are acknowledging that this After Visit Summary has been reviewed with you and you have received a copy. Patient Signature:  ____________________________________________________________ Date:  ____________________________________________________________  
  
Jacqlyn Newcomer Provider Signature:  ____________________________________________________________ Date:  ____________________________________________________________

## 2017-11-29 NOTE — IP AVS SNAPSHOT
[facility] 
 
 
 [address] [facility] [address] 
[phone] Patient: Bradly Montoya MRN: [id number] :1945 My Medications STOP taking these medications   
 aspirin delayed-release 81 mg tablet Replaced by:  aspirin 81 mg chewable tablet TAKE these medications as instructed Instructions Each Dose to Equal  
 Morning Noon Evening Bedtime  
 albuterol-ipratropium 2.5 mg-0.5 mg/3 ml Nebu Commonly known as:  Barabara Peoples Your next dose is:  As needed 3 mL by Nebulization route as needed. 3 mL  
    
   
   
   
  
 aspirin 81 mg chewable tablet Your next dose is:  2017 Take 1 Tab by mouth daily. 81 mg  
    
   
   
   
  
 atorvastatin 40 mg tablet Commonly known as:  LIPITOR Your next dose is:  2017 Take 1 Tab by mouth daily. 40 mg  
    
   
   
   
  
 clopidogrel 75 mg Tab Commonly known as:  PLAVIX Your next dose is:  2017 Take 1 Tab by mouth daily. 75 mg FLONASE 50 mcg/actuation nasal spray Generic drug:  fluticasone 2 Sprays by Both Nostrils route daily. 2 Spray  
    
   
   
   
  
 gabapentin 300 mg capsule Commonly known as:  NEURONTIN Your next dose is:  2017 Take 300 mg by mouth two (2) times a day. 300 mg  
    
   
   
  
   
  
 IMDUR 30 mg tablet Generic drug:  isosorbide mononitrate ER Your next dose is:  2017 Take  by mouth daily. metoprolol tartrate 50 mg tablet Commonly known as:  LOPRESSOR Your next dose is:  2017 Take  by mouth two (2) times a day. nitroglycerin 0.4 mg SL tablet Commonly known as:  NITROSTAT Your next dose is:  As needed  
   
 by SubLINGual route every five (5) minutes as needed for Chest Pain. PROTONIX 40 mg tablet Generic drug:  pantoprazole Your next dose is:  12-1-2017 Take 40 mg by mouth daily. 40 mg  
    
   
   
   
  
 traMADol 50 mg tablet Commonly known as:  ULTRAM  
Your next dose is:  As needed Take 50 mg by mouth every six (6) hours as needed for Pain. 50 mg Where to Get Your Medications These medications were sent to [facility] [address] [facility] [place] [address] [facility] Phone:  [phone]  
  atorvastatin 40 mg tablet  
 clopidogrel 75 mg Tab

## 2017-11-29 NOTE — PROGRESS NOTES
TRANSFER - IN REPORT:    Verbal report received from Keely RN(name) on Dariel Chadwick  being received from cath lab(unit) for routine progression of care      Report consisted of patients Situation, Background, Assessment and   Recommendations(SBAR). Information from the following report(s) Procedure Summary was reviewed with the receiving nurse. Opportunity for questions and clarification was provided. Assessment completed upon patients arrival to unit and care assumed.

## 2017-11-29 NOTE — PROCEDURES
Brief Cardiac Procedure Note    Patient: Cheryl Thakkar MRN: [id number]  SSN: [id number]    YOB: 1945  Age: 73 y.o. Sex: female      Date of Procedure: 11/29/2017     Pre-procedure Diagnosis: Chest pain CCS Class IV    Post-procedure Diagnosis: Coronary Artery Disease    Procedure: Left Heart Catheterization with Percutaneous Coronary Intervention    Brief Description of Procedure: As above    Performed By: Coby Azevedo MD     Assistants: None    Anesthesia: Moderate Sedation    Estimated Blood Loss: Less than 10 mL      Specimens: None    Implants: None    Findings: FFR LAD 0.86. FFR D1 0.89.  80% distal RCA treated with 2.5 x 16 mm Synergy BLESSING    Complications: None    Recommendations: Continue medical therapy.     Signed By: Coby Azevedo MD     November 29, 2017

## 2017-11-29 NOTE — PROGRESS NOTES
Cardiac Rehab:  Spoke with patient regarding referral to cardiac rehab. Patient meets admission criteria based on PCI (date 11/29/17). Discussed lifestyle modifications to promote cardiac wellness. Patient indicated that she wants to participate in cardiac rehab program.  Orientation scheduled for 12/12/17 at 2:30. Cardiologist is Dr Bobbi Morales.

## 2017-11-30 VITALS
TEMPERATURE: 98.7 F | DIASTOLIC BLOOD PRESSURE: 61 MMHG | OXYGEN SATURATION: 93 % | SYSTOLIC BLOOD PRESSURE: 136 MMHG | WEIGHT: 161.8 LBS | HEIGHT: 64 IN | RESPIRATION RATE: 20 BRPM | HEART RATE: 85 BPM | BODY MASS INDEX: 27.62 KG/M2

## 2017-11-30 LAB
ANION GAP SERPL CALC-SCNC: 10 MMOL/L (ref 7–16)
ATRIAL RATE: 77 BPM
BASOPHILS # BLD: 0 K/UL (ref 0–0.2)
BASOPHILS NFR BLD: 0 % (ref 0–2)
BUN SERPL-MCNC: 10 MG/DL (ref 8–23)
CALCIUM SERPL-MCNC: 9.3 MG/DL (ref 8.3–10.4)
CALCULATED P AXIS, ECG09: 78 DEGREES
CALCULATED R AXIS, ECG10: 75 DEGREES
CALCULATED T AXIS, ECG11: 83 DEGREES
CHLORIDE SERPL-SCNC: 107 MMOL/L (ref 98–107)
CHOLEST SERPL-MCNC: 180 MG/DL
CO2 SERPL-SCNC: 26 MMOL/L (ref 21–32)
CREAT SERPL-MCNC: 0.96 MG/DL (ref 0.6–1)
DIAGNOSIS, 93000: NORMAL
DIFFERENTIAL METHOD BLD: ABNORMAL
EOSINOPHIL # BLD: 0.1 K/UL (ref 0–0.8)
EOSINOPHIL NFR BLD: 1 % (ref 0.5–7.8)
ERYTHROCYTE [DISTWIDTH] IN BLOOD BY AUTOMATED COUNT: 15.4 % (ref 11.9–14.6)
GLUCOSE SERPL-MCNC: 78 MG/DL (ref 65–100)
HCT VFR BLD AUTO: 45.4 % (ref 35.8–46.3)
HDLC SERPL-MCNC: 46 MG/DL (ref 40–60)
HDLC SERPL: 3.9 {RATIO}
HGB BLD-MCNC: 15.3 G/DL (ref 11.7–15.4)
IMM GRANULOCYTES # BLD: 0 K/UL (ref 0–0.5)
IMM GRANULOCYTES NFR BLD AUTO: 0 % (ref 0–5)
LDLC SERPL CALC-MCNC: 110.2 MG/DL
LIPID PROFILE,FLP: ABNORMAL
LYMPHOCYTES # BLD: 3.1 K/UL (ref 0.5–4.6)
LYMPHOCYTES NFR BLD: 25 % (ref 13–44)
MAGNESIUM SERPL-MCNC: 1.8 MG/DL (ref 1.8–2.4)
MCH RBC QN AUTO: 31.7 PG (ref 26.1–32.9)
MCHC RBC AUTO-ENTMCNC: 33.7 G/DL (ref 31.4–35)
MCV RBC AUTO: 94.2 FL (ref 79.6–97.8)
MONOCYTES # BLD: 1 K/UL (ref 0.1–1.3)
MONOCYTES NFR BLD: 8 % (ref 4–12)
NEUTS SEG # BLD: 8.3 K/UL (ref 1.7–8.2)
NEUTS SEG NFR BLD: 66 % (ref 43–78)
P-R INTERVAL, ECG05: 138 MS
PLATELET # BLD AUTO: 207 K/UL (ref 150–450)
PLATELET COMMENTS,PCOM: ADEQUATE
PMV BLD AUTO: 10.9 FL (ref 10.8–14.1)
POTASSIUM SERPL-SCNC: 4.1 MMOL/L (ref 3.5–5.1)
Q-T INTERVAL, ECG07: 408 MS
QRS DURATION, ECG06: 74 MS
QTC CALCULATION (BEZET), ECG08: 461 MS
RBC # BLD AUTO: 4.82 M/UL (ref 4.05–5.25)
RBC MORPH BLD: ABNORMAL
SODIUM SERPL-SCNC: 143 MMOL/L (ref 136–145)
TRIGL SERPL-MCNC: 119 MG/DL (ref 35–150)
VENTRICULAR RATE, ECG03: 77 BPM
VLDLC SERPL CALC-MCNC: 23.8 MG/DL (ref 6–23)
WBC # BLD AUTO: 12.5 K/UL (ref 4.3–11.1)
WBC MORPH BLD: ABNORMAL

## 2017-11-30 PROCEDURE — 83735 ASSAY OF MAGNESIUM: CPT | Performed by: INTERNAL MEDICINE

## 2017-11-30 PROCEDURE — 93005 ELECTROCARDIOGRAM TRACING: CPT | Performed by: INTERNAL MEDICINE

## 2017-11-30 PROCEDURE — 36415 COLL VENOUS BLD VENIPUNCTURE: CPT | Performed by: INTERNAL MEDICINE

## 2017-11-30 PROCEDURE — 74011250637 HC RX REV CODE- 250/637: Performed by: NURSE PRACTITIONER

## 2017-11-30 PROCEDURE — 80048 BASIC METABOLIC PNL TOTAL CA: CPT | Performed by: INTERNAL MEDICINE

## 2017-11-30 PROCEDURE — 74011250637 HC RX REV CODE- 250/637: Performed by: INTERNAL MEDICINE

## 2017-11-30 PROCEDURE — 80061 LIPID PANEL: CPT | Performed by: INTERNAL MEDICINE

## 2017-11-30 PROCEDURE — 85025 COMPLETE CBC W/AUTO DIFF WBC: CPT | Performed by: INTERNAL MEDICINE

## 2017-11-30 RX ORDER — GUAIFENESIN 100 MG/5ML
81 LIQUID (ML) ORAL DAILY
Status: SHIPPED | COMMUNITY
Start: 2017-11-30

## 2017-11-30 RX ORDER — CLOPIDOGREL BISULFATE 75 MG/1
75 TABLET ORAL DAILY
Qty: 30 TAB | Refills: 11 | Status: SHIPPED | OUTPATIENT
Start: 2017-11-30 | End: 2017-11-30

## 2017-11-30 RX ORDER — DIPHENHYDRAMINE HCL 25 MG
25 CAPSULE ORAL
Status: DISCONTINUED | OUTPATIENT
Start: 2017-11-30 | End: 2017-11-30 | Stop reason: HOSPADM

## 2017-11-30 RX ORDER — ATORVASTATIN CALCIUM 40 MG/1
40 TABLET, FILM COATED ORAL DAILY
Qty: 30 TAB | Refills: 11 | Status: SHIPPED | OUTPATIENT
Start: 2017-11-30 | End: 2017-11-30

## 2017-11-30 RX ORDER — CLOPIDOGREL BISULFATE 75 MG/1
75 TABLET ORAL DAILY
Qty: 30 TAB | Refills: 11 | Status: SHIPPED | OUTPATIENT
Start: 2017-11-30 | End: 2019-08-15

## 2017-11-30 RX ORDER — ATORVASTATIN CALCIUM 40 MG/1
40 TABLET, FILM COATED ORAL DAILY
Qty: 30 TAB | Refills: 11 | Status: SHIPPED | OUTPATIENT
Start: 2017-11-30 | End: 2019-01-21

## 2017-11-30 RX ADMIN — GABAPENTIN 300 MG: 300 CAPSULE ORAL at 08:37

## 2017-11-30 RX ADMIN — ATORVASTATIN CALCIUM 40 MG: 20 TABLET, FILM COATED ORAL at 08:37

## 2017-11-30 RX ADMIN — METOPROLOL TARTRATE 50 MG: 50 TABLET ORAL at 08:37

## 2017-11-30 RX ADMIN — CLOPIDOGREL BISULFATE 75 MG: 75 TABLET ORAL at 08:36

## 2017-11-30 RX ADMIN — Medication 81 MG: at 08:37

## 2017-11-30 RX ADMIN — LORATADINE 10 MG: 10 TABLET ORAL at 08:36

## 2017-11-30 RX ADMIN — Medication 5 ML: at 05:25

## 2017-11-30 RX ADMIN — ISOSORBIDE MONONITRATE 30 MG: 30 TABLET, EXTENDED RELEASE ORAL at 08:36

## 2017-11-30 RX ADMIN — PANTOPRAZOLE SODIUM 40 MG: 40 TABLET, DELAYED RELEASE ORAL at 08:36

## 2017-11-30 RX ADMIN — DIPHENHYDRAMINE HYDROCHLORIDE 25 MG: 25 CAPSULE ORAL at 02:56

## 2017-11-30 NOTE — DISCHARGE INSTRUCTIONS
Cardiac Catheterization/Angiography Discharge Instructions    *Check the puncture site frequently for swelling or bleeding. If you see any bleeding, lie down and apply pressure over the area with a clean town or washcloth. Notify your doctor for any redness, swelling, drainage or oozing from the puncture site. Notify your doctor for any fever or chills. *If the leg or arm with the puncture becomes cold, numb or painful, call [address] [place] [address] [address] Cardiology at  [phone]. *Activity should be limited for the next 48 hours. Climb stairs as little as possible and avoid any stooping, bending or strenuous activity for 48 hours. No heavy lifting (anything over 10 pounds) for three days. *Do not drive for 48 hours. *You may resume your usual diet. Drink more fluids than usual.    *Have a responsible person drive you home and stay with you for at least 24 hours after your heart catheterization/angiography. *You may remove the bandage from your Right and Groin in 24 hours. You may shower in 24 hours. No tub baths, hot tubs or swimming for one week. Do not place any lotions, creams, powders, ointments over the puncture site for one week. You may place a clean band-aid over the puncture site each day for 5 days. Change this daily. Percutaneous Coronary Intervention: What to Expect at [facility]    Percutaneous coronary intervention (PCI) is the name for procedures that are used to open a narrowed or blocked coronary artery. The two most common PCI procedures are coronary angioplasty and coronary stent placement. Your groin or arm may have a bruise and feel sore for a day or two after a percutaneous coronary intervention (PCI). You can do light activities around the house, but nothing strenuous for several days. This care sheet gives you a general idea about how long it will take for you to recover. But each person recovers at a different pace.  Follow the steps below to get better as quickly as possible. How can you care for yourself at home? Activity  ? · If the doctor gave you a sedative:  ¨ For 24 hours, don't do anything that requires attention to detail. It takes time for the medicine's effects to completely wear off. ¨ For your safety, do not drive or operate any machinery that could be dangerous. Wait until the medicine wears off and you can think clearly and react easily. ? · Do not do strenuous exercise and do not lift, pull, or push anything heavy until your doctor says it is okay. This may be for a day or two. You can walk around the house and do light activity, such as cooking. ? · If the catheter was placed in your groin, try not to walk up stairs for the first couple of days. ? · If the catheter was placed in your arm near your wrist, do not bend your wrist deeply for the first couple of days. Be careful using your hand to get into and out of a chair or bed. ? · Carry your stent identification card with you at all times. ? · If your doctor recommends it, get more exercise. Walking is a good choice. Bit by bit, increase the amount you walk every day. Try for at least 30 minutes on most days of the week. Diet  ? · Drink plenty of fluids to help your body flush out the dye. If you have kidney, heart, or liver disease and have to limit fluids, talk with your doctor before you increase the amount of fluids you drink. ? · Keep eating a heart-healthy diet that has lots of fruits, vegetables, and whole grains. If you have not been eating this way, talk to your doctor. You also may want to talk to a dietitian. This expert can help you to learn about healthy foods and plan meals. Medicines  ? · Your doctor will tell you if and when you can restart your medicines. He or she will also give you instructions about taking any new medicines. ? · If you take blood thinners, such as warfarin (Coumadin), clopidogrel (Plavix), or aspirin, be sure to talk to your doctor.  He or she will tell you if and when to start taking those medicines again. Make sure that you understand exactly what your doctor wants you to do.   ? · Your doctor will prescribe blood-thinning medicines. You will likely take aspirin plus another antiplatelet, such as clopidogrel (Plavix). It is very important that you take these medicines exactly as directed. These medicines help keep the coronary artery open and reduce your risk of a heart attack. ? · Call your doctor if you think you are having a problem with your medicine. ?Care of the catheter site  ? · For 1 or 2 days, keep a bandage over the spot where the catheter was inserted. The bandage probably will fall off in this time. ? · Put ice or a cold pack on the area for 10 to 20 minutes at a time to help with soreness or swelling. Put a thin cloth between the ice and your skin. ? · You may shower 24 to 48 hours after the procedure, if your doctor okays it. Pat the incision dry. ? · Do not soak the catheter site until it is healed. Don't take a bath for 1 week, or until your doctor tells you it isokay. Follow-up care is a key part of your treatment and safety. Be sure to make and go to all appointments, and call your doctor if you are having problems. It's also a good idea to know your test results and keep a list of the medicines you take. When should you call for help? Call 911 anytime you think you may need emergency care. For example, call if:  ? · You passed out (lost consciousness). ? · You have severe trouble breathing. ? · You have sudden chest pain and shortness of breath, or you cough up blood. ? · You have symptoms of a heart attack, such as:  ¨ Chest pain or pressure. ¨ Sweating. ¨ Shortness of breath. ¨ Nausea or vomiting. ¨ Pain that spreads from the chest to the neck, jaw, or one or both shoulders or arms. ¨ Dizziness or lightheadedness. ¨ A fast or uneven pulse. After calling 911, chew 1 adult-strength aspirin.  Wait for an ambulance. Do not try to drive yourself. ? · You have been diagnosed with angina, and you have angina symptoms that do not go away with rest or are not getting better within 5 minutes after you take one dose of nitroglycerin. ?Call your doctor now or seek immediate medical care if:  ? · You are bleeding from the area where the catheter was put in your artery. ? · You have a fast-growing, painful lump at the catheter site. ? · You have signs of infection, such as:  ¨ Increased pain, swelling, warmth, or redness. ¨ Red streaks leading from the catheter site. ¨ Pus draining from the catheter site. ¨ A fever. ? · Your leg or arm looks blue or feels cold, numb, or tingly. ? Watch closely for changes in your health, and be sure to contact your doctor if you have any problems. Where can you learn more? Go to http://piyush-bárbara.info/. Enter D777 in the search box to learn more about \"Percutaneous Coronary Intervention: What to Expect at Home. \"  Current as of: September 21, 2016  Content Version: 11.4  © 4752-2130 [employer]wise, [employer]. Care instructions adapted under license by [employer] (which disclaims liability or warranty for this information). If you have questions about a medical condition or this instruction, always ask your healthcare professional. [place] [address] any warranty or liability for your use of this information.

## 2017-11-30 NOTE — PROGRESS NOTES
Care Management Interventions  PCP Verified by CM: Yes (Sept 2017)  Mode of Transport at Discharge: Other (see comment) (family)  Transition of Care Consult (CM Consult): [address]: No  Reason Outside [place]: Patient already serviced by other home care/hospice agency  Current Support Network: Lives with Spouse  Confirm Follow Up Transport: Self  Plan discussed with Pt/Family/Caregiver: Yes  Freedom of Choice Offered: Yes  Discharge Location  Discharge Placement: Home with home health  Met with patient for d/c planning. Patient alert and oriented x 3 independent of ADL's and lives with her . States she has O2 at home that she uses at night and also a nebulizer. Requires no assistive devices for ambulation. Patient is current with [facility] and I notified them of patient discharge and since patient was outpatient then does not require a new home health order and per [facility] they will continue to follow her after d/c. Patient states she is able to obtain her medications with her prescription drug plan and she is still able to drive. Patient to d/c home with  and resumption of [facility].

## 2017-11-30 NOTE — PROGRESS NOTES
Problem: Falls - Risk of  Goal: *Absence of Falls  Document Micah Fall Risk and appropriate interventions in the flowsheet.    Outcome: Progressing Towards Goal  Fall Risk Interventions:            Medication Interventions: Teach patient to arise slowly         History of Falls Interventions: Door open when patient unattended

## 2017-11-30 NOTE — PROGRESS NOTES
Home tramadol counted with patient and pt signed narc sheet-placed on paper chart. Other home medications returned. Paper signed and placed on chart.

## 2017-11-30 NOTE — PROGRESS NOTES
Verbal bedside report given to oncoming RN, Sebastian Daniel. Patient's situation, background, assessment and recommendations provided. Opportunity for questions provided. Oncoming RN assumed care of patient.

## 2017-11-30 NOTE — DISCHARGE SUMMARY
[facility] Cardiology Discharge Summary     Patient ID:  Rose Fuentes  [id number]  60 y.o.  1945    Admit date: 11/29/2017    Discharge date:  11/30/2017    Admitting Physician: Storm Lopez MD     Discharge Physician: Dr. Fito Hennessy    Admission Diagnoses: Coronary artery disease involving native coronary artery of native heart with angina pectoris [facility]  Post PTCA    Discharge Diagnoses:    Diagnosis    Post PTCA    Coronary artery disease involving native coronary artery of native heart with angina pectoris ([address]    Essential hypertension    Hyperlipemia, mixed    Tobacco abuse       Cardiology Procedures this admission:  Left heart catheterization with PCI  Consults: None    Hospital Course: Patient was seen at the office of [facility] Cardiology by Dr. Lexy Nelson for complaints of chest pain with known non-surgical multi-vessel disease and was subsequently scheduled for an AM Admission LH at [facility] on 11/29/17. Patient underwent cardiac catheterization by Dr. Lexy Nelson. Patient was found to have stenosis of the mid LAD/mid diagonal with FFR of 0.89 indicating hemodynamically insignificant stenosis that will be treated medically. Patient was also found to have 80% stenosis of the distal RCA that was stented with a 2.5 x 16-mm with 0% residual stenosis. Patient tolerated the procedure well and was taken to the telemetry floor for recovery. The morning of discharge, patient was up feeling well without any complaints of chest pain or shortness of breath. Patient's right groin cath site was clean, dry and intact without hematoma or bruit. Patient's labs were WNL. Patient was seen and examined by Dr. Fito Hennessy and determined stable and ready for discharge. Patient was instructed on the importance of medication compliance including taking aspirin and Plavix everyday without missing a dose. After receiving drug eluting stents, the patient will remain on dual anti-platelet therapy for 1 year. Indication for treatment and risk of dual antiplatelet therapy was discussed with the patient and she understands to seek medical care immediately if any signs of bleeding. For maximized medical therapy for CAD, patient will continue BB and statin as well. The patient will follow up with [address] [place] [address] [address] Cardiology -- Dr. Xiang Elias 29 at 1:00[facility] office. Patient has been referred to cardiac rehab. DISPOSITION: The patient is being discharged home in stable condition on a low saturated fat, low cholesterol and low salt diet. The patient is instructed to advance activities as tolerated to the limit of fatigue or shortness of breath. The patient is instructed to avoid all heavy lifting, straining, stooping or squatting for 3-5 days. The patient is instructed to watch the cath site for bleeding/oozing; if seen, the patient is instructed to apply firm pressure with a clean cloth and call [address] [place] [address] [address] Cardiology at [phone]. The patient is instructed to watch for signs of infection which include: increasing area of redness, fever/hot to touch or purulent drainage at the catheterization site. The patient is instructed not to soak in a bathtub for 7-10 days, but is cleared to shower. The patient is instructed to call the office or return to the ER for immediate evaluation for any shortness of breath or chest pain not relieved by NTG. Discharge Exam: Patient has been seen by Dr. Fito Hennessy: see his progress note for exam details.     Visit Vitals    /61    Pulse 76    Temp 98.5 °F (36.9 °C)    Resp 18    Ht 5' 4\" (1.626 m)    Wt 73.4 kg (161 lb 12.8 oz)    SpO2 100%    Breastfeeding No    BMI 27.77 kg/m2       Recent Results (from the past 24 hour(s))   EKG, 12 LEAD, INITIAL    Collection Time: 11/29/17  7:53 AM   Result Value Ref Range    Ventricular Rate 69 BPM    Atrial Rate 69 BPM    P-R Interval 146 ms    QRS Duration 72 ms    Q-T Interval 408 ms    QTC Calculation (Bezet) 437 ms    Calculated P Axis 76 degrees    Calculated R Axis 56 degrees    Calculated T Axis 74 degrees    Diagnosis       Normal sinus rhythm  Right atrial enlargement  Borderline ECG  No previous ECGs available  Confirmed by [facility]  MD (), Betito Goldman ([id number]) on 11/29/2017 9:42:00 AM     POC ACTIVATED CLOTTING TIME    Collection Time: 11/29/17  9:10 AM   Result Value Ref Range    Activated Clotting Time (POC) 323 (H) 70 - 128 SECS   EKG, 12 LEAD, INITIAL    Collection Time: 11/29/17 10:23 AM   Result Value Ref Range    Ventricular Rate 68 BPM    Atrial Rate 68 BPM    P-R Interval 158 ms    QRS Duration 72 ms    Q-T Interval 424 ms    QTC Calculation (Bezet) 450 ms    Calculated P Axis 75 degrees    Calculated R Axis 70 degrees    Calculated T Axis 78 degrees    Diagnosis       Normal sinus rhythm  Right atrial enlargement  Borderline ECG  When compared with ECG of 29-NOV-2017 07:53,  No significant change was found  Confirmed by KAYLA JACKSON (), Mingo Buckner ([id number]) on 11/29/2017 35:97:77 PM     METABOLIC PANEL, BASIC    Collection Time: 11/30/17  5:25 AM   Result Value Ref Range    Sodium 143 136 - 145 mmol/L    Potassium 4.1 3.5 - 5.1 mmol/L    Chloride 107 98 - 107 mmol/L    CO2 26 21 - 32 mmol/L    Anion gap 10 7 - 16 mmol/L    Glucose 78 65 - 100 mg/dL    BUN 10 8 - 23 MG/DL    Creatinine 0.96 0.6 - 1.0 MG/DL    GFR est AA >60 >60 ml/min/1.73m2    GFR est non-AA >60 >60 ml/min/1.73m2    Calcium 9.3 8.3 - 10.4 MG/DL   LIPID PANEL    Collection Time: 11/30/17  5:25 AM   Result Value Ref Range    LIPID PROFILE          Cholesterol, total 180 <200 MG/DL    Triglyceride 119 35 - 150 MG/DL    HDL Cholesterol 46 40 - 60 MG/DL    LDL, calculated 110.2 (H) <100 MG/DL    VLDL, calculated 23.8 (H) 6.0 - 23.0 MG/DL    CHOL/HDL Ratio 3.9     MAGNESIUM    Collection Time: 11/30/17  5:25 AM   Result Value Ref Range    Magnesium 1.8 1.8 - 2.4 mg/dL   CBC WITH AUTOMATED DIFF    Collection Time: 11/30/17  5:25 AM   Result Value Ref Range    WBC 12.5 (H) 4.3 - 11.1 K/uL    RBC 4.82 4.05 - 5.25 M/uL    HGB 15.3 11.7 - 15.4 g/dL    HCT 45.4 35.8 - 46.3 %    MCV 94.2 79.6 - 97.8 FL    MCH 31.7 26.1 - 32.9 PG    MCHC 33.7 31.4 - 35.0 g/dL    RDW 15.4 (H) 11.9 - 14.6 %    PLATELET 995 182 - 700 K/uL    MPV 10.9 10.8 - 14.1 FL    DF PENDING    EKG, 12 LEAD, INITIAL    Collection Time: 11/30/17  6:49 AM   Result Value Ref Range    Ventricular Rate 77 BPM    Atrial Rate 77 BPM    P-R Interval 138 ms    QRS Duration 74 ms    Q-T Interval 408 ms    QTC Calculation (Bezet) 461 ms    Calculated P Axis 78 degrees    Calculated R Axis 75 degrees    Calculated T Axis 83 degrees    Diagnosis       Normal sinus rhythm  Right atrial enlargement  Borderline ECG  When compared with ECG of 29-NOV-2017 10:23,  No significant change was found           Patient Instructions:   Current Discharge Medication List      START taking these medications    Details   aspirin 81 mg chewable tablet Take 1 Tab by mouth daily. clopidogrel (PLAVIX) 75 mg tab Take 1 Tab by mouth daily. Qty: 30 Tab, Refills: 11         CONTINUE these medications which have CHANGED    Details   atorvastatin (LIPITOR) 40 mg tablet Take 1 Tab by mouth daily. Qty: 30 Tab, Refills: 11         CONTINUE these medications which have NOT CHANGED    Details   fluticasone (FLONASE) 50 mcg/actuation nasal spray 2 Sprays by Both Nostrils route daily. isosorbide mononitrate ER (IMDUR) 30 mg tablet Take  by mouth daily. traMADol (ULTRAM) 50 mg tablet Take 50 mg by mouth every six (6) hours as needed for Pain.      gabapentin (NEURONTIN) 300 mg capsule Take 300 mg by mouth two (2) times a day. metoprolol tartrate (LOPRESSOR) 50 mg tablet Take  by mouth two (2) times a day. pantoprazole (PROTONIX) 40 mg tablet Take 40 mg by mouth daily. nitroglycerin (NITROSTAT) 0.4 mg SL tablet by SubLINGual route every five (5) minutes as needed for Chest Pain.       albuterol-ipratropium (DUO-NEB) 2.5 mg-0.5 mg/3 ml nebu 3 mL by Nebulization route as needed.          STOP taking these medications       aspirin delayed-release 81 mg tablet Comments:   Reason for Stopping: Was taking 162 mg

## 2017-11-30 NOTE — PROGRESS NOTES
Bedside shift change report given to  Quita Gonzales (oncoming nurse) by myself (offgoing nurse). Report included the following information SBAR. , pt right groin site soft, no hematoma. Minimal bleeding noted. Circled bleed with a marker for continued monitoring. Mati CORRAL (oncoming nurse) at bedside also.

## 2017-11-30 NOTE — PROGRESS NOTES
Discharge instructions reviewed with patient. Prescriptions given for lipitor and plavix and med info sheets provided for all new medications. Opportunity for questions provided. Patient voiced understanding of all discharge instructions. IVs and monitor off at discharge; home medicatoin returned by primary RN.

## 2018-01-10 PROBLEM — I25.10 CORONARY ARTERY DISEASE INVOLVING NATIVE CORONARY ARTERY OF NATIVE HEART: Status: ACTIVE | Noted: 2017-11-27

## 2018-02-16 ENCOUNTER — HOSPITAL ENCOUNTER (OUTPATIENT)
Dept: LAB | Age: 73
Discharge: HOME | End: 2018-02-16
Payer: MEDICARE

## 2018-02-16 DIAGNOSIS — I10 ESSENTIAL HYPERTENSION: ICD-10-CM

## 2018-02-16 DIAGNOSIS — I25.10 CORONARY ARTERY DISEASE INVOLVING NATIVE CORONARY ARTERY OF NATIVE HEART WITHOUT ANGINA PECTORIS: ICD-10-CM

## 2018-02-16 LAB
ANION GAP SERPL CALC-SCNC: 5 MMOL/L
BUN SERPL-MCNC: 21 MG/DL (ref 8–23)
CALCIUM SERPL-MCNC: 9 MG/DL (ref 8.3–10.4)
CHLORIDE SERPL-SCNC: 104 MMOL/L (ref 98–107)
CO2 SERPL-SCNC: 29 MMOL/L (ref 21–32)
CREAT SERPL-MCNC: 1.2 MG/DL (ref 0.6–1)
GLUCOSE SERPL-MCNC: 86 MG/DL (ref 65–100)
POTASSIUM SERPL-SCNC: 3.9 MMOL/L (ref 3.5–5.1)
SODIUM SERPL-SCNC: 138 MMOL/L (ref 136–145)

## 2018-02-16 PROCEDURE — 80048 BASIC METABOLIC PNL TOTAL CA: CPT | Performed by: INTERNAL MEDICINE

## 2018-02-16 PROCEDURE — 36415 COLL VENOUS BLD VENIPUNCTURE: CPT | Performed by: INTERNAL MEDICINE

## 2019-01-21 ENCOUNTER — HOSPITAL ENCOUNTER (OUTPATIENT)
Dept: LAB | Age: 74
Discharge: HOME | End: 2019-01-21
Payer: MEDICARE

## 2019-01-21 DIAGNOSIS — E78.2 HYPERLIPEMIA, MIXED: ICD-10-CM

## 2019-01-21 LAB
CHOLEST SERPL-MCNC: 211 MG/DL
HDLC SERPL-MCNC: 42 MG/DL (ref 40–60)
HDLC SERPL: 5 {RATIO}
LDLC SERPL CALC-MCNC: 127.4 MG/DL
LIPID PROFILE,FLP: ABNORMAL
TRIGL SERPL-MCNC: 208 MG/DL (ref 35–150)
VLDLC SERPL CALC-MCNC: 41.6 MG/DL (ref 6–23)

## 2019-01-21 PROCEDURE — 36415 COLL VENOUS BLD VENIPUNCTURE: CPT

## 2019-01-21 PROCEDURE — 80061 LIPID PANEL: CPT

## 2020-07-09 NOTE — PROGRESS NOTES
Pre-assessment call completed. Instructed to arrive at 10:30 am for [facility] on 7/10/20. Instructed to remain NPO after MN and to HOLD all vitamins and supplements and to take AM medications.

## 2020-07-10 ENCOUNTER — HOSPITAL ENCOUNTER (OUTPATIENT)
Dept: CARDIAC CATH/INVASIVE PROCEDURES | Age: 75
Discharge: HOME | End: 2020-07-11
Attending: INTERNAL MEDICINE | Admitting: INTERNAL MEDICINE
Payer: MEDICARE

## 2020-07-10 DIAGNOSIS — E78.2 HYPERLIPEMIA, MIXED: ICD-10-CM

## 2020-07-10 DIAGNOSIS — R07.2 PRECORDIAL PAIN: ICD-10-CM

## 2020-07-10 DIAGNOSIS — Z98.61 POST PTCA: ICD-10-CM

## 2020-07-10 DIAGNOSIS — I25.118 CORONARY ARTERY DISEASE OF NATIVE ARTERY OF NATIVE HEART WITH STABLE ANGINA PECTORIS (HCC): ICD-10-CM

## 2020-07-10 DIAGNOSIS — I10 ESSENTIAL HYPERTENSION: ICD-10-CM

## 2020-07-10 PROBLEM — R07.9 CHEST PAIN: Status: ACTIVE | Noted: 2020-07-10

## 2020-07-10 LAB
ANION GAP SERPL CALC-SCNC: 6 MMOL/L (ref 7–16)
ATRIAL RATE: 80 BPM
ATRIAL RATE: 83 BPM
BUN SERPL-MCNC: 16 MG/DL (ref 8–23)
CALCIUM SERPL-MCNC: 9.4 MG/DL (ref 8.3–10.4)
CALCULATED P AXIS, ECG09: 74 DEGREES
CALCULATED P AXIS, ECG09: 78 DEGREES
CALCULATED R AXIS, ECG10: -69 DEGREES
CALCULATED R AXIS, ECG10: -88 DEGREES
CALCULATED T AXIS, ECG11: 79 DEGREES
CALCULATED T AXIS, ECG11: 82 DEGREES
CHLORIDE SERPL-SCNC: 107 MMOL/L (ref 98–107)
CO2 SERPL-SCNC: 27 MMOL/L (ref 21–32)
CREAT SERPL-MCNC: 1.26 MG/DL (ref 0.6–1)
DIAGNOSIS, 93000: NORMAL
DIAGNOSIS, 93000: NORMAL
ERYTHROCYTE [DISTWIDTH] IN BLOOD BY AUTOMATED COUNT: 15.9 % (ref 11.9–14.6)
GLUCOSE SERPL-MCNC: 87 MG/DL (ref 65–100)
HCT VFR BLD AUTO: 46.8 % (ref 35.8–46.3)
HGB BLD-MCNC: 14.9 G/DL (ref 11.7–15.4)
INR PPP: 1.1
MCH RBC QN AUTO: 30.4 PG (ref 26.1–32.9)
MCHC RBC AUTO-ENTMCNC: 31.8 G/DL (ref 31.4–35)
MCV RBC AUTO: 95.5 FL (ref 79.6–97.8)
NRBC # BLD: 0 K/UL (ref 0–0.2)
P-R INTERVAL, ECG05: 138 MS
P-R INTERVAL, ECG05: 146 MS
PLATELET # BLD AUTO: 239 K/UL (ref 150–450)
PMV BLD AUTO: 10.4 FL (ref 9.4–12.3)
POTASSIUM SERPL-SCNC: 4.5 MMOL/L (ref 3.5–5.1)
PROTHROMBIN TIME: 14.4 SEC (ref 12–14.7)
Q-T INTERVAL, ECG07: 390 MS
Q-T INTERVAL, ECG07: 394 MS
QRS DURATION, ECG06: 70 MS
QRS DURATION, ECG06: 70 MS
QTC CALCULATION (BEZET), ECG08: 454 MS
QTC CALCULATION (BEZET), ECG08: 458 MS
RBC # BLD AUTO: 4.9 M/UL (ref 4.05–5.2)
SODIUM SERPL-SCNC: 140 MMOL/L (ref 136–145)
VENTRICULAR RATE, ECG03: 80 BPM
VENTRICULAR RATE, ECG03: 83 BPM
WBC # BLD AUTO: 11.5 K/UL (ref 4.3–11.1)

## 2020-07-10 PROCEDURE — 74011000258 HC RX REV CODE- 258: Performed by: INTERNAL MEDICINE

## 2020-07-10 PROCEDURE — C1725 CATH, TRANSLUMIN NON-LASER: HCPCS

## 2020-07-10 PROCEDURE — 93458 L HRT ARTERY/VENTRICLE ANGIO: CPT

## 2020-07-10 PROCEDURE — 77030004558 HC CATH ANGI DX SUPR TORQ CARD -A

## 2020-07-10 PROCEDURE — 80048 BASIC METABOLIC PNL TOTAL CA: CPT

## 2020-07-10 PROCEDURE — 74011636320 HC RX REV CODE- 636/320: Performed by: INTERNAL MEDICINE

## 2020-07-10 PROCEDURE — 77030016699 HC CATH ANGI DX INFN1 CARD -A

## 2020-07-10 PROCEDURE — 74011250636 HC RX REV CODE- 250/636: Performed by: INTERNAL MEDICINE

## 2020-07-10 PROCEDURE — 85027 COMPLETE CBC AUTOMATED: CPT

## 2020-07-10 PROCEDURE — 85610 PROTHROMBIN TIME: CPT

## 2020-07-10 PROCEDURE — 74011250637 HC RX REV CODE- 250/637: Performed by: INTERNAL MEDICINE

## 2020-07-10 PROCEDURE — C1760 CLOSURE DEV, VASC: HCPCS

## 2020-07-10 PROCEDURE — 93005 ELECTROCARDIOGRAM TRACING: CPT | Performed by: INTERNAL MEDICINE

## 2020-07-10 PROCEDURE — C1769 GUIDE WIRE: HCPCS

## 2020-07-10 PROCEDURE — C1894 INTRO/SHEATH, NON-LASER: HCPCS

## 2020-07-10 PROCEDURE — 99152 MOD SED SAME PHYS/QHP 5/>YRS: CPT

## 2020-07-10 PROCEDURE — 74011000250 HC RX REV CODE- 250: Performed by: INTERNAL MEDICINE

## 2020-07-10 PROCEDURE — 77030004559 HC CATH ANGI DX SUPT CARD -B

## 2020-07-10 PROCEDURE — 99153 MOD SED SAME PHYS/QHP EA: CPT

## 2020-07-10 PROCEDURE — 94640 AIRWAY INHALATION TREATMENT: CPT

## 2020-07-10 PROCEDURE — C1887 CATHETER, GUIDING: HCPCS

## 2020-07-10 RX ORDER — SODIUM CHLORIDE 0.9 % (FLUSH) 0.9 %
5-40 SYRINGE (ML) INJECTION AS NEEDED
Status: DISCONTINUED | OUTPATIENT
Start: 2020-07-10 | End: 2020-07-11 | Stop reason: HOSPADM

## 2020-07-10 RX ORDER — MIDAZOLAM HYDROCHLORIDE 1 MG/ML
.5-2 INJECTION, SOLUTION INTRAMUSCULAR; INTRAVENOUS
Status: DISCONTINUED | OUTPATIENT
Start: 2020-07-10 | End: 2020-07-10 | Stop reason: HOSPADM

## 2020-07-10 RX ORDER — METOPROLOL SUCCINATE 25 MG/1
25 TABLET, EXTENDED RELEASE ORAL DAILY
Status: DISCONTINUED | OUTPATIENT
Start: 2020-07-11 | End: 2020-07-11

## 2020-07-10 RX ORDER — GUAIFENESIN 100 MG/5ML
81 LIQUID (ML) ORAL DAILY
Status: DISCONTINUED | OUTPATIENT
Start: 2020-07-11 | End: 2020-07-11 | Stop reason: HOSPADM

## 2020-07-10 RX ORDER — FLUTICASONE PROPIONATE 110 UG/1
4 AEROSOL, METERED RESPIRATORY (INHALATION)
Status: DISCONTINUED | OUTPATIENT
Start: 2020-07-10 | End: 2020-07-11 | Stop reason: HOSPADM

## 2020-07-10 RX ORDER — HYDROCODONE BITARTRATE AND ACETAMINOPHEN 5; 325 MG/1; MG/1
1 TABLET ORAL
Status: DISCONTINUED | OUTPATIENT
Start: 2020-07-10 | End: 2020-07-11 | Stop reason: HOSPADM

## 2020-07-10 RX ORDER — GUAIFENESIN 100 MG/5ML
81 LIQUID (ML) ORAL ONCE
Status: DISCONTINUED | OUTPATIENT
Start: 2020-07-10 | End: 2020-07-10

## 2020-07-10 RX ORDER — SODIUM CHLORIDE 0.9 % (FLUSH) 0.9 %
5-40 SYRINGE (ML) INJECTION EVERY 8 HOURS
Status: DISCONTINUED | OUTPATIENT
Start: 2020-07-10 | End: 2020-07-11 | Stop reason: HOSPADM

## 2020-07-10 RX ORDER — DICYCLOMINE HYDROCHLORIDE 10 MG/1
10 CAPSULE ORAL 4 TIMES DAILY
Status: DISCONTINUED | OUTPATIENT
Start: 2020-07-10 | End: 2020-07-11 | Stop reason: HOSPADM

## 2020-07-10 RX ORDER — MORPHINE SULFATE 2 MG/ML
2 INJECTION, SOLUTION INTRAMUSCULAR; INTRAVENOUS
Status: DISCONTINUED | OUTPATIENT
Start: 2020-07-10 | End: 2020-07-11 | Stop reason: HOSPADM

## 2020-07-10 RX ORDER — FENTANYL CITRATE 50 UG/ML
25-50 INJECTION, SOLUTION INTRAMUSCULAR; INTRAVENOUS
Status: DISCONTINUED | OUTPATIENT
Start: 2020-07-10 | End: 2020-07-10 | Stop reason: HOSPADM

## 2020-07-10 RX ORDER — NITROGLYCERIN 0.4 MG/1
0.4 TABLET SUBLINGUAL AS NEEDED
Status: DISCONTINUED | OUTPATIENT
Start: 2020-07-10 | End: 2020-07-11 | Stop reason: HOSPADM

## 2020-07-10 RX ORDER — CLOPIDOGREL BISULFATE 75 MG/1
600 TABLET ORAL ONCE
Status: COMPLETED | OUTPATIENT
Start: 2020-07-10 | End: 2020-07-10

## 2020-07-10 RX ORDER — MAG HYDROX/ALUMINUM HYD/SIMETH 200-200-20
30 SUSPENSION, ORAL (FINAL DOSE FORM) ORAL
Status: DISCONTINUED | OUTPATIENT
Start: 2020-07-10 | End: 2020-07-11 | Stop reason: HOSPADM

## 2020-07-10 RX ORDER — CLOPIDOGREL BISULFATE 75 MG/1
75 TABLET ORAL DAILY
Status: DISCONTINUED | OUTPATIENT
Start: 2020-07-11 | End: 2020-07-11 | Stop reason: HOSPADM

## 2020-07-10 RX ORDER — FLUTICASONE PROPIONATE 50 MCG
2 SPRAY, SUSPENSION (ML) NASAL DAILY
Status: DISCONTINUED | OUTPATIENT
Start: 2020-07-11 | End: 2020-07-11 | Stop reason: HOSPADM

## 2020-07-10 RX ORDER — SODIUM CHLORIDE 9 MG/ML
75 INJECTION, SOLUTION INTRAVENOUS CONTINUOUS
Status: DISCONTINUED | OUTPATIENT
Start: 2020-07-10 | End: 2020-07-11

## 2020-07-10 RX ORDER — PANTOPRAZOLE SODIUM 40 MG/1
40 TABLET, DELAYED RELEASE ORAL DAILY
Status: DISCONTINUED | OUTPATIENT
Start: 2020-07-11 | End: 2020-07-11 | Stop reason: HOSPADM

## 2020-07-10 RX ORDER — LIDOCAINE HYDROCHLORIDE 10 MG/ML
1-10 INJECTION, SOLUTION EPIDURAL; INFILTRATION; INTRACAUDAL; PERINEURAL ONCE
Status: COMPLETED | OUTPATIENT
Start: 2020-07-10 | End: 2020-07-10

## 2020-07-10 RX ORDER — MAG HYDROX/ALUMINUM HYD/SIMETH 200-200-20
30 SUSPENSION, ORAL (FINAL DOSE FORM) ORAL AS NEEDED
Status: DISCONTINUED | OUTPATIENT
Start: 2020-07-10 | End: 2020-07-10 | Stop reason: HOSPADM

## 2020-07-10 RX ORDER — IPRATROPIUM BROMIDE AND ALBUTEROL SULFATE 2.5; .5 MG/3ML; MG/3ML
3 SOLUTION RESPIRATORY (INHALATION)
Status: DISCONTINUED | OUTPATIENT
Start: 2020-07-10 | End: 2020-07-11 | Stop reason: HOSPADM

## 2020-07-10 RX ORDER — TRAZODONE HYDROCHLORIDE 50 MG/1
50 TABLET ORAL
Status: DISCONTINUED | OUTPATIENT
Start: 2020-07-10 | End: 2020-07-11 | Stop reason: HOSPADM

## 2020-07-10 RX ORDER — HEPARIN SODIUM 200 [USP'U]/100ML
2 INJECTION, SOLUTION INTRAVENOUS CONTINUOUS
Status: DISCONTINUED | OUTPATIENT
Start: 2020-07-10 | End: 2020-07-10 | Stop reason: HOSPADM

## 2020-07-10 RX ORDER — NITROGLYCERIN 0.4 MG/1
0.4 TABLET SUBLINGUAL
Status: DISCONTINUED | OUTPATIENT
Start: 2020-07-10 | End: 2020-07-10 | Stop reason: SDUPTHER

## 2020-07-10 RX ORDER — IPRATROPIUM BROMIDE AND ALBUTEROL SULFATE 2.5; .5 MG/3ML; MG/3ML
3 SOLUTION RESPIRATORY (INHALATION)
Status: DISCONTINUED | OUTPATIENT
Start: 2020-07-10 | End: 2020-07-11

## 2020-07-10 RX ORDER — ACETAMINOPHEN 325 MG/1
650 TABLET ORAL
Status: DISCONTINUED | OUTPATIENT
Start: 2020-07-10 | End: 2020-07-11 | Stop reason: HOSPADM

## 2020-07-10 RX ORDER — METHADONE HYDROCHLORIDE 10 MG/1
10 TABLET ORAL DAILY
Status: DISCONTINUED | OUTPATIENT
Start: 2020-07-11 | End: 2020-07-11

## 2020-07-10 RX ORDER — SODIUM CHLORIDE 9 MG/ML
75 INJECTION, SOLUTION INTRAVENOUS CONTINUOUS
Status: DISCONTINUED | OUTPATIENT
Start: 2020-07-10 | End: 2020-07-10

## 2020-07-10 RX ADMIN — SODIUM CHLORIDE 75 ML/HR: 900 INJECTION, SOLUTION INTRAVENOUS at 11:34

## 2020-07-10 RX ADMIN — IOPAMIDOL 180 ML: 755 INJECTION, SOLUTION INTRAVENOUS at 13:58

## 2020-07-10 RX ADMIN — FENTANYL CITRATE 50 MCG: 50 INJECTION, SOLUTION INTRAMUSCULAR; INTRAVENOUS at 13:25

## 2020-07-10 RX ADMIN — ALUMINUM HYDROXIDE, MAGNESIUM HYDROXIDE, AND SIMETHICONE 30 ML: 200; 200; 20 SUSPENSION ORAL at 20:17

## 2020-07-10 RX ADMIN — IPRATROPIUM BROMIDE AND ALBUTEROL SULFATE 3 ML: .5; 3 SOLUTION RESPIRATORY (INHALATION) at 20:52

## 2020-07-10 RX ADMIN — ALUMINUM HYDROXIDE, MAGNESIUM HYDROXIDE, AND SIMETHICONE 30 ML: 200; 200; 20 SUSPENSION ORAL at 13:54

## 2020-07-10 RX ADMIN — BIVALIRUDIN 1.75 MG/KG/HR: 250 INJECTION INTRACAVERNOUS at 13:38

## 2020-07-10 RX ADMIN — HEPARIN SODIUM 2 UNITS/HR: 200 INJECTION, SOLUTION INTRAVENOUS at 13:28

## 2020-07-10 RX ADMIN — Medication 10 ML: at 15:42

## 2020-07-10 RX ADMIN — SODIUM CHLORIDE 75 ML/HR: 900 INJECTION, SOLUTION INTRAVENOUS at 15:41

## 2020-07-10 RX ADMIN — LIDOCAINE HYDROCHLORIDE 10 ML: 10 INJECTION, SOLUTION EPIDURAL; INFILTRATION; INTRACAUDAL; PERINEURAL at 13:30

## 2020-07-10 RX ADMIN — CLOPIDOGREL BISULFATE 600 MG: 75 TABLET ORAL at 13:54

## 2020-07-10 RX ADMIN — DICYCLOMINE HYDROCHLORIDE 10 MG: 10 CAPSULE ORAL at 20:17

## 2020-07-10 RX ADMIN — HYDROCODONE BITARTRATE AND ACETAMINOPHEN 1 TABLET: 5; 325 TABLET ORAL at 20:19

## 2020-07-10 RX ADMIN — MIDAZOLAM 2 MG: 1 INJECTION INTRAMUSCULAR; INTRAVENOUS at 13:27

## 2020-07-10 RX ADMIN — MIDAZOLAM 2 MG: 1 INJECTION INTRAMUSCULAR; INTRAVENOUS at 13:25

## 2020-07-10 NOTE — PROGRESS NOTES
TRANSFER - IN REPORT:    Verbal report received from Desirae Fuentes RN [facility] for routine progression of care      Report consisted of patients Situation, Background, Assessment and Recommendations(SBAR). Information from the following report(s) SBAR, Procedure Summary, MAR and Cardiac Rhythm NSR was reviewed with the receiving nurse. Opportunity for questions and clarification was provided. Assessment completed upon patients arrival to unit and care assumed.

## 2020-07-10 NOTE — PROGRESS NOTES
Dual skin assessment performed. Sacrum without redness or breakdown no allevyn present. Not indicated. Right femoral cath site is CDI with gauze and tegaderm. No bleeding or hematoma is present in groin. Heels without injury.  No further abnormalities noted

## 2020-07-10 NOTE — PROGRESS NOTES
TRANSFER - IN REPORT:    Verbal report received from [facility] RN(name) on John Dryer  being received from cath lab(unit) for routine progression of care      Report consisted of patients Situation, Background, Assessment and   Recommendations(SBAR). Information from the following report(s) Procedure Summary was reviewed with the receiving nurse. Opportunity for questions and clarification was provided. Assessment completed upon patients arrival to unit and care assumed.

## 2020-07-10 NOTE — PROGRESS NOTES
TRANSFER - OUT REPORT:    R femoral [facility] with Dr Mena Jones  Versed 4 mg  Fentanyl 50 mcg  Angiomax off at 1355  Plavix 600 mg  Mylanta   Balloon to the OM   Angioseal to site  Site soft no bleeding or hematoma noted at site    Verbal report given to Josh(name) on Shaye Ridley  being transferred to CPRU(unit) for routine progression of care       Report consisted of patients Situation, Background, Assessment and   Recommendations(SBAR). Information from the following report(s) Procedure Summary was reviewed with the receiving nurse. Lines:   Peripheral IV 07/10/20 Right Antecubital (Active)       Peripheral IV 07/10/20 Anterior;Proximal;Right Forearm (Active)        Opportunity for questions and clarification was provided.       Patient transported with:   Registered Nurse

## 2020-07-10 NOTE — PROGRESS NOTES
TRANSFER - OUT REPORT:    Verbal report given to Kaushal CORRAL(name) on Bacilio Long  being transferred to CV step down(unit) for routine progression of care       Report consisted of patients Situation, Background, Assessment and   Recommendations(SBAR). Information from the following report(s) Procedure Summary was reviewed with the receiving nurse. Lines:   Peripheral IV 07/10/20 Right Antecubital (Active)       Peripheral IV 07/10/20 Anterior;Proximal;Right Forearm (Active)        Opportunity for questions and clarification was provided.       Patient transported with:   Registered Nurse

## 2020-07-10 NOTE — PROGRESS NOTES
Patient received to [address] [place] [address] room # 17  Ambulatory from [facility]. Patient scheduled for [facility] today with Dr Felipa Ramírez. Procedure reviewed & questions answered, voiced good understanding consent obtained & placed on chart. All medications and medical history reviewed. Will prep patient per orders. Patient & family updated on plan of care. The patient has a fraility score of 3-MANAGING WELL, based on reports no recent falls.

## 2020-07-10 NOTE — PROCEDURES
Brief Cardiac Procedure Note    Patient: Valentin Bocanegra MRN: [id number]  SSN: [id number]    YOB: 1945  Age: 69 y.o. Sex: female      Date of Procedure: 7/10/2020     Pre-procedure Diagnosis: Typical Angina    Post-procedure Diagnosis: Coronary Artery Disease    Procedure: Left Heart Catheterization with Percutaneous Coronary Intervention    Brief Description of Procedure: As above    Performed By: Charisse Ward MD     Assistants: None    Anesthesia: Moderate Sedation    Estimated Blood Loss: Less than 10 mL      Specimens: None    Implants: None    Findings: POBA of OM1 with 3.0 balloon. 90-20%Very tortuous vessel concerned of stent fracture and given acceptable angiographic result no stent was placed. Complications: None    Recommendations: Continue medical therapy.     Signed By: Charisse Ward MD     July 10, 2020

## 2020-07-11 VITALS
RESPIRATION RATE: 51 BRPM | WEIGHT: 140 LBS | HEART RATE: 83 BPM | TEMPERATURE: 98.1 F | DIASTOLIC BLOOD PRESSURE: 78 MMHG | OXYGEN SATURATION: 93 % | BODY MASS INDEX: 23.9 KG/M2 | HEIGHT: 64 IN | SYSTOLIC BLOOD PRESSURE: 175 MMHG

## 2020-07-11 LAB
ANION GAP SERPL CALC-SCNC: 5 MMOL/L (ref 7–16)
ATRIAL RATE: 82 BPM
BASOPHILS # BLD: 0 K/UL (ref 0–0.2)
BASOPHILS NFR BLD: 0 % (ref 0–2)
BUN SERPL-MCNC: 16 MG/DL (ref 8–23)
CALCIUM SERPL-MCNC: 8.6 MG/DL (ref 8.3–10.4)
CALCULATED P AXIS, ECG09: 85 DEGREES
CALCULATED R AXIS, ECG10: 49 DEGREES
CALCULATED T AXIS, ECG11: 86 DEGREES
CHLORIDE SERPL-SCNC: 108 MMOL/L (ref 98–107)
CHOLEST SERPL-MCNC: 183 MG/DL
CO2 SERPL-SCNC: 28 MMOL/L (ref 21–32)
CREAT SERPL-MCNC: 0.93 MG/DL (ref 0.6–1)
DIAGNOSIS, 93000: NORMAL
DIFFERENTIAL METHOD BLD: ABNORMAL
EOSINOPHIL # BLD: 0.1 K/UL (ref 0–0.8)
EOSINOPHIL NFR BLD: 1 % (ref 0.5–7.8)
ERYTHROCYTE [DISTWIDTH] IN BLOOD BY AUTOMATED COUNT: 15.9 % (ref 11.9–14.6)
GLUCOSE SERPL-MCNC: 98 MG/DL (ref 65–100)
HCT VFR BLD AUTO: 44 % (ref 35.8–46.3)
HDLC SERPL-MCNC: 45 MG/DL (ref 40–60)
HDLC SERPL: 4.1 {RATIO}
HGB BLD-MCNC: 14.2 G/DL (ref 11.7–15.4)
IMM GRANULOCYTES # BLD AUTO: 0 K/UL (ref 0–0.5)
IMM GRANULOCYTES NFR BLD AUTO: 0 % (ref 0–5)
LDLC SERPL CALC-MCNC: 114.4 MG/DL
LIPID PROFILE,FLP: ABNORMAL
LYMPHOCYTES # BLD: 2.4 K/UL (ref 0.5–4.6)
LYMPHOCYTES NFR BLD: 23 % (ref 13–44)
MAGNESIUM SERPL-MCNC: 2.1 MG/DL (ref 1.8–2.4)
MCH RBC QN AUTO: 30.4 PG (ref 26.1–32.9)
MCHC RBC AUTO-ENTMCNC: 32.3 G/DL (ref 31.4–35)
MCV RBC AUTO: 94.2 FL (ref 79.6–97.8)
MONOCYTES # BLD: 1.1 K/UL (ref 0.1–1.3)
MONOCYTES NFR BLD: 11 % (ref 4–12)
NEUTS SEG # BLD: 6.8 K/UL (ref 1.7–8.2)
NEUTS SEG NFR BLD: 64 % (ref 43–78)
NRBC # BLD: 0 K/UL (ref 0–0.2)
P-R INTERVAL, ECG05: 132 MS
PLATELET # BLD AUTO: 226 K/UL (ref 150–450)
PMV BLD AUTO: 10.3 FL (ref 9.4–12.3)
POTASSIUM SERPL-SCNC: 4 MMOL/L (ref 3.5–5.1)
Q-T INTERVAL, ECG07: 400 MS
QRS DURATION, ECG06: 72 MS
QTC CALCULATION (BEZET), ECG08: 467 MS
RBC # BLD AUTO: 4.67 M/UL (ref 4.05–5.2)
SODIUM SERPL-SCNC: 141 MMOL/L (ref 136–145)
TRIGL SERPL-MCNC: 118 MG/DL (ref 35–150)
VENTRICULAR RATE, ECG03: 82 BPM
VLDLC SERPL CALC-MCNC: 23.6 MG/DL (ref 6–23)
WBC # BLD AUTO: 10.6 K/UL (ref 4.3–11.1)

## 2020-07-11 PROCEDURE — 80061 LIPID PANEL: CPT

## 2020-07-11 PROCEDURE — 99217 PR OBSERVATION CARE DISCHARGE MANAGEMENT: CPT | Performed by: INTERNAL MEDICINE

## 2020-07-11 PROCEDURE — 83735 ASSAY OF MAGNESIUM: CPT

## 2020-07-11 PROCEDURE — 94640 AIRWAY INHALATION TREATMENT: CPT

## 2020-07-11 PROCEDURE — 74011000250 HC RX REV CODE- 250: Performed by: INTERNAL MEDICINE

## 2020-07-11 PROCEDURE — 36415 COLL VENOUS BLD VENIPUNCTURE: CPT

## 2020-07-11 PROCEDURE — 74011250637 HC RX REV CODE- 250/637: Performed by: INTERNAL MEDICINE

## 2020-07-11 PROCEDURE — 80048 BASIC METABOLIC PNL TOTAL CA: CPT

## 2020-07-11 PROCEDURE — 85025 COMPLETE CBC W/AUTO DIFF WBC: CPT

## 2020-07-11 RX ORDER — IPRATROPIUM BROMIDE AND ALBUTEROL SULFATE 2.5; .5 MG/3ML; MG/3ML
3 SOLUTION RESPIRATORY (INHALATION)
Status: DISCONTINUED | OUTPATIENT
Start: 2020-07-11 | End: 2020-07-11 | Stop reason: HOSPADM

## 2020-07-11 RX ORDER — ATORVASTATIN CALCIUM 80 MG/1
80 TABLET, FILM COATED ORAL DAILY
Status: DISCONTINUED | OUTPATIENT
Start: 2020-07-11 | End: 2020-07-11 | Stop reason: HOSPADM

## 2020-07-11 RX ORDER — LISINOPRIL AND HYDROCHLOROTHIAZIDE 12.5; 2 MG/1; MG/1
1 TABLET ORAL DAILY
Status: DISCONTINUED | OUTPATIENT
Start: 2020-07-11 | End: 2020-07-11 | Stop reason: HOSPADM

## 2020-07-11 RX ORDER — METOPROLOL SUCCINATE 25 MG/1
50 TABLET, EXTENDED RELEASE ORAL DAILY
Status: DISCONTINUED | OUTPATIENT
Start: 2020-07-12 | End: 2020-07-11 | Stop reason: HOSPADM

## 2020-07-11 RX ORDER — LISINOPRIL AND HYDROCHLOROTHIAZIDE 12.5; 2 MG/1; MG/1
1 TABLET ORAL DAILY
Qty: 30 TAB | Refills: 5 | Status: SHIPPED | OUTPATIENT
Start: 2020-07-12 | End: 2020-09-24

## 2020-07-11 RX ORDER — POTASSIUM CHLORIDE 20 MEQ/1
20 TABLET, EXTENDED RELEASE ORAL DAILY
Qty: 30 TAB | Refills: 5 | Status: SHIPPED | OUTPATIENT
Start: 2020-07-12 | End: 2020-09-24

## 2020-07-11 RX ORDER — ATORVASTATIN CALCIUM 80 MG/1
80 TABLET, FILM COATED ORAL DAILY
Qty: 30 TAB | Refills: 5 | Status: SHIPPED | OUTPATIENT
Start: 2020-07-12 | End: 2020-09-24

## 2020-07-11 RX ORDER — POTASSIUM CHLORIDE 20 MEQ/1
20 TABLET, EXTENDED RELEASE ORAL DAILY
Status: DISCONTINUED | OUTPATIENT
Start: 2020-07-11 | End: 2020-07-11 | Stop reason: HOSPADM

## 2020-07-11 RX ORDER — CLOPIDOGREL BISULFATE 75 MG/1
75 TABLET ORAL DAILY
Qty: 30 TAB | Refills: 11 | Status: SHIPPED | OUTPATIENT
Start: 2020-07-12

## 2020-07-11 RX ADMIN — IPRATROPIUM BROMIDE AND ALBUTEROL SULFATE 3 ML: .5; 3 SOLUTION RESPIRATORY (INHALATION) at 08:48

## 2020-07-11 RX ADMIN — LISINOPRIL AND HYDROCHLOROTHIAZIDE 1 TABLET: 12.5; 2 TABLET ORAL at 08:00

## 2020-07-11 RX ADMIN — HYDROCODONE BITARTRATE AND ACETAMINOPHEN 1 TABLET: 5; 325 TABLET ORAL at 01:17

## 2020-07-11 RX ADMIN — Medication 10 ML: at 06:43

## 2020-07-11 RX ADMIN — METOPROLOL SUCCINATE 25 MG: 25 TABLET, EXTENDED RELEASE ORAL at 06:41

## 2020-07-11 RX ADMIN — DICYCLOMINE HYDROCHLORIDE 10 MG: 10 CAPSULE ORAL at 08:00

## 2020-07-11 RX ADMIN — CLOPIDOGREL BISULFATE 75 MG: 75 TABLET ORAL at 08:00

## 2020-07-11 RX ADMIN — ASPIRIN 81 MG: 81 TABLET, CHEWABLE ORAL at 08:00

## 2020-07-11 RX ADMIN — ATORVASTATIN CALCIUM 80 MG: 80 TABLET, FILM COATED ORAL at 08:00

## 2020-07-11 RX ADMIN — POTASSIUM CHLORIDE 20 MEQ: 20 TABLET, EXTENDED RELEASE ORAL at 08:00

## 2020-07-11 RX ADMIN — PANTOPRAZOLE SODIUM 40 MG: 40 TABLET, DELAYED RELEASE ORAL at 06:42

## 2020-07-11 NOTE — PROGRESS NOTES
[facility] CARDIOLOGY PROGRESS NOTE           7/11/2020 7:28 AM    Admit Date: 7/10/2020      Subjective:   Patient feels well. She has long-standing poorly controlled BP at home. BP markedly elevated here. Has long-standing ulcers per her report. No CP or dyspnea. ROS:  Cardiovascular:  As noted above    Objective:      Vitals:    07/11/20 0201 07/11/20 0301 07/11/20 0639 07/11/20 0641   BP: 163/80 169/88 191/86 191/86   Pulse: 83 86 95 86   Resp: 15 16 20    Temp:  98.1 °F (36.7 °C)     SpO2:       Weight:       Height:           Physical Exam:  General-No Acute Distress  Neck- supple, no JVD  CV- regular rate and rhythm no MRG  Lung- clear bilaterally  Abd- soft, nontender, nondistended  Ext- no edema bilaterally. Skin- warm and dry    Data Review:   Recent Labs     07/11/20  0323 07/10/20  1127    140   K 4.0 4.5   MG 2.1  --    BUN 16 16   CREA 0.93 1.26*   GLU 98 87   WBC 10.6 11.5*   HGB 14.2 14.9   HCT 44.0 46.8*    239   INR  --  1.1   CHOL 183  --    LDLC 114.4*  --    HDL 45  --        Assessment/Plan:     Active Problems:    Chest pain (7/10/2020)    PTA of the OM1. No stent placed. On ASA and Clopidogrel. Start lisinopril/HCTZ 20/12.5mg and add KCL. Increase metoprolol succinate to home dose of 50mg. Start statin therapy - atorvastatin 80mg daily. DC home. Follow up with DR Ndiaye in 2 weeks.           Scotty Rojas MD  7/11/2020 7:28 AM

## 2020-07-11 NOTE — DISCHARGE INSTRUCTIONS
Patient Education        Percutaneous Coronary Intervention: What to Expect at Home  Your Recovery    Percutaneous coronary intervention (PCI) is the name for procedures that are used to open a narrowed or blocked coronary artery. The two most common PCI procedures are coronary angioplasty and coronary stent placement. Your groin or arm may have a bruise and feel sore for a day or two after a percutaneous coronary intervention (PCI). You can do light activities around the house, but nothing strenuous for several days. This care sheet gives you a general idea about how long it will take for you to recover. But each person recovers at a different pace. Follow the steps below to get better as quickly as possible. How can you care for yourself at home? Activity  · If the doctor gave you a sedative:  ? For 24 hours, don't do anything that requires attention to detail, such as going to work, making important decisions, or signing any legal documents. It takes time for the medicine's effects to completely wear off.  ? For your safety, do not drive or operate any machinery that could be dangerous. Wait until the medicine wears off and you can think clearly and react easily. · Do not do strenuous exercise and do not lift, pull, or push anything heavy until your doctor says it is okay. This may be for a day or two. You can walk around the house and do light activity, such as cooking. · If the catheter was placed in your groin, try not to walk up stairs for the first couple of days. · If the catheter was placed in your arm near your wrist, do not bend your wrist deeply for the first couple of days. Be careful using your hand to get into and out of a chair or bed. · Carry your stent identification card with you at all times. · If your doctor recommends it, get more exercise. Walking is a good choice. Bit by bit, increase the amount you walk every day.  Try for at least 30 minutes on most days of the week.  Diet  · Drink plenty of fluids to help your body flush out the dye. If you have kidney, heart, or liver disease and have to limit fluids, talk with your doctor before you increase the amount of fluids you drink. · Keep eating a heart-healthy diet that has lots of fruits, vegetables, and whole grains. If you have not been eating this way, talk to your doctor. You also may want to talk to a dietitian. This expert can help you to learn about healthy foods and plan meals. Medicines  · Your doctor will tell you if and when you can restart your medicines. He or she will also give you instructions about taking any new medicines. · If you take aspirin or some other blood thinner, ask your doctor if and when to start taking it again. Make sure that you understand exactly what your doctor wants you to do. · Your doctor will prescribe blood-thinning medicines. You will likely take aspirin plus another antiplatelet, such as clopidogrel (Plavix). It is very important that you take these medicines exactly as directed. These medicines help keep the coronary artery open and reduce your risk of a heart attack. · Call your doctor if you think you are having a problem with your medicine. Care of the catheter site  · For 1 or 2 days, keep a bandage over the spot where the catheter was inserted. The bandage probably will fall off in this time. · Put ice or a cold pack on the area for 10 to 20 minutes at a time to help with soreness or swelling. Put a thin cloth between the ice and your skin. · You may shower 24 to 48 hours after the procedure, if your doctor okays it. Pat the incision dry. · Do not soak the catheter site until it is healed. Don't take a bath for 1 week, or until your doctor tells you it is okay. · Watch for bleeding from the site.  A small amount of blood (up to the size of a quarter) on the bandage can be normal.  · If you are bleeding, lie down and press on the area for 15 minutes to try to make it stop. If the bleeding does not stop, call your doctor or seek immediate medical care. Follow-up care is a key part of your treatment and safety. Be sure to make and go to all appointments, and call your doctor if you are having problems. It's also a good idea to know your test results and keep a list of the medicines you take. When should you call for help? [id number] anytime you think you may need emergency care. For example, call if:  · You passed out (lost consciousness). · You have severe trouble breathing. · You have sudden chest pain and shortness of breath, or you cough up blood. · You have symptoms of a heart attack, such as:  ? Chest pain or pressure. ? Sweating. ? Shortness of breath. ? Nausea or vomiting. ? Pain that spreads from the chest to the neck, jaw, or one or both shoulders or arms. ? Dizziness or lightheadedness. ? A fast or uneven pulse. After calling 911, chew 1 adult-strength aspirin. Wait for an ambulance. Do not try to drive yourself. · You have been diagnosed with angina, and you have angina symptoms that do not go away with rest or are not getting better within 5 minutes after you take one dose of nitroglycerin. Call your doctor now or seek immediate medical care if:  · You are bleeding from the area where the catheter was put in your artery. · You have a fast-growing, painful lump at the catheter site. · You have signs of infection, such as:  ? Increased pain, swelling, warmth, or redness. ? Red streaks leading from the catheter site. ? Pus draining from the catheter site. ? A fever. · Your leg, arm, or hand is painful, looks blue, or feels cold, numb, or tingly. Watch closely for changes in your health, and be sure to contact your doctor if you have any problems. Where can you learn more? Go to http://piyush-bárbara.info/  Enter X857 in the search box to learn more about \"Percutaneous Coronary Intervention: What to Expect at Home. \"  Current as of: December 16, 2019               Content Version: 12.5  © 7499-2851 Healthwise, [employer]. Care instructions adapted under license by [employer] (which disclaims liability or warranty for this information). If you have questions about a medical condition or this instruction, always ask your healthcare professional. [address] any warranty or liability for your use of this information. DISCHARGE SUMMARY from Nurse    PATIENT INSTRUCTIONS:    After general anesthesia or intravenous sedation, for 24 hours or while taking prescription Narcotics:  · Limit your activities  · Do not drive and operate hazardous machinery  · Do not make important personal or business decisions  · Do  not drink alcoholic beverages  · If you have not urinated within 8 hours after discharge, please contact your surgeon on call. Report the following to your surgeon:  · Excessive pain, swelling, redness or odor of or around the surgical area  · Temperature over 100.5  · Nausea and vomiting lasting longer than 4 hours or if unable to take medications  · Any signs of decreased circulation or nerve impairment to extremity: change in color, persistent  numbness, tingling, coldness or increase pain  · Any questions    What to do at Home:  Recommended activity: You are being sent home on a low saturated fat, low cholesterol and low salt diet. Advance activities as tolerated to the limit of fatigue or shortness of breath. Avoid all heavy lifting, bending, squatting or stooping for 3-5 days. Watch the cath site for bleeding/oozing; if seen, apply firm pressure with a clean cloth and call [facility] at [phone]. Watch for signs of infection which include: increasing area of redness, fever/hot to touch or purulent drainage at the catheterization site. Do not soak in a bathtub for 7-10 days, but please shower.  Please do not hesitate to call the office or return to the ER for immediate evaluation for any shortness of breath or chest pain not relieved by NTG. *  Please give a list of your current medications to your Primary Care Provider. *  Please update this list whenever your medications are discontinued, doses are      changed, or new medications (including over-the-counter products) are added. *  Please carry medication information at all times in case of emergency situations. These are general instructions for a healthy lifestyle:    No smoking/ No tobacco products/ Avoid exposure to second hand smoke  Surgeon General's Warning:  Quitting smoking now greatly reduces serious risk to your health. Obesity, smoking, and sedentary lifestyle greatly increases your risk for illness    A healthy diet, regular physical exercise & weight monitoring are important for maintaining a healthy lifestyle    You may be retaining fluid if you have a history of heart failure or if you experience any of the following symptoms:  Weight gain of 3 pounds or more overnight or 5 pounds in a week, increased swelling in our hands or feet or shortness of breath while lying flat in bed. Please call your doctor as soon as you notice any of these symptoms; do not wait until your next office visit. The discharge information has been reviewed with the patient. The patient verbalized understanding. Discharge medications reviewed with the patient and appropriate educational materials and side effects teaching were provided. ___________________________________________________________________________________________________________________________________  Patient Education       Patient Education      Atorvastatin (Lipitor) - (By mouth)   Why this medicine is used:   Treats high cholesterol and triglyceride levels. Reduces the risk of angina, stroke, heart attack, or certain heart and blood vessel problems.   Contact a nurse or doctor right away if you have:  · Severe headache, confusion, trouble speaking  · Dark urine or pale stools  · Yellow skin or eyes  · Nausea, vomiting, loss of appetite, stomach pain  · Muscle pain, tenderness, or weakness; unusual tiredness     Common side effects:  · Diarrhea  · Joint pain  © 2017 [address] Information is for End User's use only and may not be sold, redistributed or otherwise used for commercial purposes. Patient Education      Metoprolol (Lopressor, Toprol XL) - (By mouth)   Why this medicine is used:   Treats high blood pressure, chest pain, and heart failure. Contact a nurse or doctor right away if you have:  · Lightheadedness, dizziness, fainting  · Rapid weight gain; swelling in your hands, ankles, or feet     Common side effects:  · Mild dizziness  · Tiredness  © 2017 [address] [employer] Information is for End User's use only and may not be sold, redistributed or otherwise used for commercial purposes.

## 2020-07-11 NOTE — PROCEDURES
[facility]  CARDIAC CATH    Name:  Mago Del Valle  MR#:  [id number]  :  1945  ACCOUNT #:  [de-identified]  DATE OF SERVICE:  07/10/2020    PROCEDURES PERFORMED:  1. Left heart catheterization, selective coronary arteriography, and left ventriculogram via the right radial approach. 2.  PCI with balloon angioplasty of the first obtuse marginal.    PREOPERATIVE DIAGNOSES:  New-onset angina. Unstable anginal pattern with recurrent chest pain at rest, relieved with nitroglycerin. POSTOPERATIVE DIAGNOSIS:  Obstructive coronary artery disease requiring percutaneous coronary intervention. SURGEON:  Kristin Kelly MD    ASSISTANT:  None. ESTIMATED BLOOD LOSS:  Less than 5 mL. SPECIMENS REMOVED:  None. COMPLICATIONS:  None. IMPLANTS:  Angio-Seal vascular closure device. ANESTHESIA:  The patient received moderate supervised conscious sedation with 4 mg Versed and 50 mcg fentanyl. Sedation start time was 1320 with a procedure completion time of 1355. Sedation was administered by Fausto Schmidt RN, under my supervision. FINDINGS:  As below. TECHNICAL FACTORS:  After informed consent was obtained, the patient was brought to the cardiac catheterization lab. The right femoral artery area was prepped and draped in usual sterile fashion. Using the Site-[place] ultrasound, the right common femoral artery was identified. Under direct visualization, the right common femoral artery was entered. A 6-[place] arterial sheath was placed without difficulty. A JL4 catheter was used to select and engage the ostium of the left main coronary artery and a 3DRC catheter was used to select and engage the ostium of the right coronary artery. Selective injection verification was performed. Pigtail catheter was used to cross the aortic valve and the left ventricle. Hemodynamic measurements and left ventriculogram were obtained.   Left ventricular aortic pressure gradient was obtained by pullback technique. Following the diagnostic procedure, the patient was referred for PCI of the obtuse marginal.  Please see procedure note for details. CONTRAST:  Isovue 180. HEMODYNAMIC RESULTS:  1. Aortic pressure was 147/74. 2.  Left ventricular end-diastolic pressure 8.  3.  There is no significant gradient across the aortic valve. CONTRAST:  Isovue 180 for diagnostic interventional procedures. ANGIOGRAPHIC RESULTS:  1. Left main coronary artery:  Large-caliber vessel, 20% distal stenosis. 2.  LAD:  Medium-caliber vessel. Diffuse 40% mid stenosis. Focal distal 50% stenosis. 3.  First obtuse marginal artery:  Small-caliber vessel, 30% ostial, 30% mid stenosis. 4.  Left circumflex is a medium-caliber vessel. Very tortuous, nondominant, 20% mid stenosis. 5.  First obtuse marginal artery: It is a medium to large-caliber vessel. It is patent. There is a stent in the proximal segment which is patent. The midportion of the vessel is very tortuous. It contains a 90% stenosis. Distal vessel is patent. 6.  Right coronary artery:  Medium-caliber, dominant vessel, 20% mid stenosis. There is a stent in the distal segment which is widely patent. 7.  Right PDA:  Small-caliber vessel. Patent. 8.  Right posterolateral branch:  Medium-caliber vessel. Patent. 9.  Left ventriculogram performed in CANDELARIO projection shows normal left ventricular systolic function. EF 60% to 65%. No focal segmental wall motion abnormalities. Aortic root is nondilated. CONCLUSIONS:  1. Patent RCA and first obtuse marginal stents. There is a high-grade stenosis in the mid first obtuse marginal and a very tortuous segment of the artery. 2.  Normal left ventricular systolic function. PLAN:  Proceed with PCI of the first obtuse marginal.    PERCUTANEOUS CORONARY INTERVENTION NOTE:  Lesion:  Mid RCA, pre-stenosis 90%, post stenosis 20%.     TECHNICAL FACTORS:  An XB3.5 guide was used to select and engage the ostium of the left main coronary artery. A Whisper Extra Support wire was positioned with significant difficulty into the distal obtuse marginal.  At this point, balloon angioplasty was performed. Initially, a 2.5 x 12-mm Emerge balloon was positioned and deployed to 14 atmospheres. Following this, a 3.0 x 12-mm Emerge balloon was deployed to 12 atmospheres. Following balloon dilatation, there was acceptable angiographic result. Wire was removed and final orthogonal projections were obtained. Given the vessel tortuosity and high degree of flexion, I was concerned about placing a stent in this area due to the risk of in-stent restenosis. Attempts at balloon angioplasty and medical therapy. CONCLUSION:  Successful balloon angioplasty of the mid first obtuse marginal with a 3.0-mm balloon. No stent was placed given the vessel tortuosity and adequate result on balloon angioplasty alone. I was concerned about stent fracture due to vessel tortuosity and motion. Note:  At the conclusion of the procedure, a right common femoral arteriogram was performed that showed the sheath contained in the right common femoral artery. A 6-[place]ian Angio-Seal vascular closure device was deployed with good technique. Good hemostasis was achieved.       MD ROLA [email or web address]  D:  07/10/2020 14:18  T:  07/11/2020 11:25  JOB #:  [id number]

## 2020-07-11 NOTE — DISCHARGE SUMMARY
[facility] Discharge Summary     Patient ID:  Sophia Rashid  [id number]  89 y.o.  1945    Admit date: 7/10/2020    Discharge date:  7/11/2020    Admitting Physician: Chandan Plasencia MD     Discharge Physician: Dr. Sheri Gage    Admission Diagnoses: Chest pain [R07.9]  Chest pain [R07.9]    Discharge Diagnoses:    Diagnosis    Chest pain    Post PTCA    Coronary artery disease involving native coronary artery of native heart    Emphysema     Essential hypertension    Hyperlipemia, mixed    Tobacco abuse    Systemic lupus erythematosus        Cardiology Procedures this admission:  Left heart catheterization with PCI  Consults: None    Hospital Course: Patient was seen at the office of [facility] by Dr. Atiya Desai for complaints of worsening CP and was subsequently scheduled for a LH at [facility] [place], [place] on 7/10/20. Patient underwent cardiac catheterization by Dr. Atiya Desai. Patient was found to have a 90% stenosis of the OM1 that was treated with POBA with 20% residual stenosis. Very tortuous vessel concerned of stent fracture and given acceptable angiographic result no stent was placed. Her RFA cath site was closed with an Angioseal at the end of the case. Patient tolerated the procedure well and was taken to the CVICU floor for recovery. The following morning patient was up feeling well without any complaints of chest pain or shortness of breath. Patient's right femoral cath site was clean, dry and intact without hematoma or bruit. Patient's labs were stable. Patient was seen and examined by Dr. Sheri Gage and determined stable and ready for discharge. Patient was instructed on the importance of medication compliance including taking Aspirin and Plavix everyday without missing a dose. For maximized medical therapy for CAD, patient will continue BB and statin therapy.  The patient will follow up with [facility] and has been referred to cardiac rehab.      DISPOSITION: The patient is being discharged home in stable condition on a low saturated fat, low cholesterol and low salt diet. The patient is instructed to advance activities as tolerated to the limit of fatigue or shortness of breath. The patient is instructed to avoid all heavy lifting, straining, stooping or squatting for 3-5 days. The patient is instructed to watch the cath site for bleeding/oozing; if seen, the patient is instructed to apply firm pressure with a clean cloth and call [facility] at [phone]. The patient is instructed to watch for signs of infection which include: increasing area of redness, fever/hot to touch or purulent drainage at the catheterization site. The patient is instructed not to soak in a bathtub for 7-10 days, but is cleared to shower. The patient is instructed to call the office or return to the ER for immediate evaluation for any shortness of breath or chest pain not relieved by NTG. Discharge Exam:   Visit Vitals  /78   Pulse 83   Temp 98.1 °F (36.7 °C)   Resp (!) 51   Ht 5' 4\" (1.626 m)   Wt 63.5 kg (140 lb)   SpO2 93%   BMI 24.03 kg/m²       Patient has been seen by Dr. Riggs Sep: see his progress note for exam details.     Recent Results (from the past 24 hour(s))   CBC W/O DIFF    Collection Time: 07/10/20 11:27 AM   Result Value Ref Range    WBC 11.5 (H) 4.3 - 11.1 K/uL    RBC 4.90 4.05 - 5.2 M/uL    HGB 14.9 11.7 - 15.4 g/dL    HCT 46.8 (H) 35.8 - 46.3 %    MCV 95.5 79.6 - 97.8 FL    MCH 30.4 26.1 - 32.9 PG    MCHC 31.8 31.4 - 35.0 g/dL    RDW 15.9 (H) 11.9 - 14.6 %    PLATELET 104 030 - 494 K/uL    MPV 10.4 9.4 - 12.3 FL    ABSOLUTE NRBC 0.00 0.0 - 0.2 K/uL   METABOLIC PANEL, BASIC    Collection Time: 07/10/20 11:27 AM   Result Value Ref Range    Sodium 140 136 - 145 mmol/L    Potassium 4.5 3.5 - 5.1 mmol/L    Chloride 107 98 - 107 mmol/L    CO2 27 21 - 32 mmol/L    Anion gap 6 (L) 7 - 16 mmol/L    Glucose 87 65 - 100 mg/dL    BUN 16 8 - 23 MG/DL    Creatinine 1.26 (H) 0.6 - 1.0 MG/DL    GFR est AA 54 (L) >60 ml/min/1.73m2    GFR est non-AA 44 (L) >60 ml/min/1.73m2    Calcium 9.4 8.3 - 10.4 MG/DL   PROTHROMBIN TIME + INR    Collection Time: 07/10/20 11:27 AM   Result Value Ref Range    Prothrombin time 14.4 12.0 - 14.7 sec    INR 1.1     EKG, 12 LEAD, INITIAL    Collection Time: 07/10/20 11:41 AM   Result Value Ref Range    Ventricular Rate 80 BPM    Atrial Rate 80 BPM    P-R Interval 138 ms    QRS Duration 70 ms    Q-T Interval 394 ms    QTC Calculation (Bezet) 454 ms    Calculated P Axis 78 degrees    Calculated R Axis -69 degrees    Calculated T Axis 79 degrees    Diagnosis       Normal sinus rhythm  Right atrial enlargement  Left axis deviation  Abnormal ECG  When compared with ECG of 30-NOV-2017 06:49,  QRS axis Shifted left  Confirmed by ST TESSY LUNA MD (), URMILA WILSON ([id number]) on 7/10/2020 4:09:12 PM     EKG, 12 LEAD, INITIAL    Collection Time: 07/10/20  2:49 PM   Result Value Ref Range    Ventricular Rate 83 BPM    Atrial Rate 83 BPM    P-R Interval 146 ms    QRS Duration 70 ms    Q-T Interval 390 ms    QTC Calculation (Bezet) 458 ms    Calculated P Axis 74 degrees    Calculated R Axis -88 degrees    Calculated T Axis 82 degrees    Diagnosis       Normal sinus rhythm  Right atrial enlargement  Left axis deviation  Pulmonary disease pattern  Abnormal ECG  When compared with ECG of 10-JUL-2020 11:41,  No significant change was found  Confirmed by ST TESSY LUNA MD (), URMILA WILSON ([id number]) on 7/10/2020 7:63:44 PM     METABOLIC PANEL, BASIC    Collection Time: 07/11/20  3:23 AM   Result Value Ref Range    Sodium 141 136 - 145 mmol/L    Potassium 4.0 3.5 - 5.1 mmol/L    Chloride 108 (H) 98 - 107 mmol/L    CO2 28 21 - 32 mmol/L    Anion gap 5 (L) 7 - 16 mmol/L    Glucose 98 65 - 100 mg/dL    BUN 16 8 - 23 MG/DL    Creatinine 0.93 0.6 - 1.0 MG/DL    GFR est AA >60 >60 ml/min/1.73m2    GFR est non-AA >60 >60 ml/min/1.73m2    Calcium 8.6 8.3 - 10.4 MG/DL LIPID PANEL    Collection Time: 07/11/20  3:23 AM   Result Value Ref Range    LIPID PROFILE          Cholesterol, total 183 <200 MG/DL    Triglyceride 118 35 - 150 MG/DL    HDL Cholesterol 45 40 - 60 MG/DL    LDL, calculated 114.4 (H) <100 MG/DL    VLDL, calculated 23.6 (H) 6.0 - 23.0 MG/DL    CHOL/HDL Ratio 4.1     MAGNESIUM    Collection Time: 07/11/20  3:23 AM   Result Value Ref Range    Magnesium 2.1 1.8 - 2.4 mg/dL   CBC WITH AUTOMATED DIFF    Collection Time: 07/11/20  3:23 AM   Result Value Ref Range    WBC 10.6 4.3 - 11.1 K/uL    RBC 4.67 4.05 - 5.2 M/uL    HGB 14.2 11.7 - 15.4 g/dL    HCT 44.0 35.8 - 46.3 %    MCV 94.2 79.6 - 97.8 FL    MCH 30.4 26.1 - 32.9 PG    MCHC 32.3 31.4 - 35.0 g/dL    RDW 15.9 (H) 11.9 - 14.6 %    PLATELET 101 831 - 484 K/uL    MPV 10.3 9.4 - 12.3 FL    ABSOLUTE NRBC 0.00 0.0 - 0.2 K/uL    DF AUTOMATED      NEUTROPHILS 64 43 - 78 %    LYMPHOCYTES 23 13 - 44 %    MONOCYTES 11 4.0 - 12.0 %    EOSINOPHILS 1 0.5 - 7.8 %    BASOPHILS 0 0.0 - 2.0 %    IMMATURE GRANULOCYTES 0 0.0 - 5.0 %    ABS. NEUTROPHILS 6.8 1.7 - 8.2 K/UL    ABS. LYMPHOCYTES 2.4 0.5 - 4.6 K/UL    ABS. MONOCYTES 1.1 0.1 - 1.3 K/UL    ABS. EOSINOPHILS 0.1 0.0 - 0.8 K/UL    ABS. BASOPHILS 0.0 0.0 - 0.2 K/UL    ABS. IMM. GRANS. 0.0 0.0 - 0.5 K/UL         Patient Instructions:     Current Discharge Medication List      START taking these medications    Details   atorvastatin (LIPITOR) 80 mg tablet Take 1 Tab by mouth daily. Qty: 30 Tab, Refills: 5      clopidogreL (PLAVIX) 75 mg tab Take 1 Tab by mouth daily. Qty: 30 Tab, Refills: 11      lisinopril-hydroCHLOROthiazide (PRINZIDE, ZESTORETIC) 20-12.5 mg per tablet Take 1 Tab by mouth daily. Qty: 30 Tab, Refills: 5      potassium chloride (K-DUR, KLOR-CON) 20 mEq tablet Take 1 Tab by mouth daily. Qty: 30 Tab, Refills: 5         CONTINUE these medications which have NOT CHANGED    Details   metoprolol succinate (TOPROL-XL) 50 mg XL tablet Take 25 mg by mouth daily. dicyclomine (BENTYL) 10 mg capsule Take 10 mg by mouth four (4) times daily. Refills: 11      methadone (DOLOPHINE) 10 mg tablet Take 10 mg by mouth daily. fluticasone (FLOVENT HFA) 220 mcg/actuation inhaler Take  by inhalation. ipratropium-albuterol (COMBIVENT RESPIMAT)  mcg/actuation inhaler Take 1 Puff by inhalation every six (6) hours. pantoprazole (PROTONIX) 40 mg tablet Take 1 Tab by mouth daily. Qty: 90 Tab, Refills: 3      aspirin 81 mg chewable tablet Take 1 Tab by mouth daily. fluticasone (FLONASE) 50 mcg/actuation nasal spray 2 Sprays by Both Nostrils route daily. albuterol-ipratropium (DUO-NEB) 2.5 mg-0.5 mg/3 ml nebu 3 mL by Nebulization route as needed. traZODone (DESYREL) 50 mg tablet Take 50 mg by mouth nightly. nitroglycerin (NITROSTAT) 0.4 mg SL tablet Place 1 sl under the tongue q 5 min prn cp, max 3 sl in a 15-min time period. Call 911 if no relief after the 3rd sl. Qty: 1 Bottle, Refills: prn      cholecalciferol, VITAMIN D3, (VITAMIN D3) 5,000 unit tab tablet Take 2,000 Units by mouth daily.          STOP taking these medications       acetaminophen (TYLENOL) 650 mg TbER Comments:   Reason for Stopping:             Kandy Santos NP  07/11/20  9:24 Frederic Friedman MD

## 2020-07-11 NOTE — PROGRESS NOTES
Dc instructions reviewed with pt. Iv removed. Heart monitor removed. Vs stable and pt is stable for dc. All questions answered.

## 2020-07-11 NOTE — PROGRESS NOTES
Chart reviewed and discussed with RN. Pt d/c home today. Has PCP and insurance. No needs voiced per pt or MD for d/c home. Care Management Interventions  PCP Verified by CM: Yes(Dr. Gregor Zhao in [facility])  Mode of Transport at Discharge:  Other (see comment)  Transition of Care Consult (CM Consult): Discharge Planning  Current Support Network: Own Home, Other(Aid that assist per RN.)  Confirm Follow Up Transport: Family  Freedom of Choice List was Provided with Basic Dialogue that Supports the Patient's Individualized Plan of Care/Goals, Treatment Preferences and Shares the Quality Data Associated with the Providers?: Yes  Discharge Location  Discharge Placement: Home

## 2020-07-11 NOTE — PROGRESS NOTES
Problem: Gas Exchange - Impaired  Goal: *Able to cough effectively  Outcome: Progressing Towards Goal

## 2020-08-28 PROBLEM — I71.40 ABDOMINAL AORTIC ANEURYSM (AAA) WITHOUT RUPTURE: Status: ACTIVE | Noted: 2020-08-28

## 2020-09-04 PROBLEM — K57.32 DIVERTICULITIS LARGE INTESTINE W/O PERFORATION OR ABSCESS W/O BLEEDING: Status: ACTIVE | Noted: 2020-09-04

## 2020-09-04 PROBLEM — Z79.02 LONG TERM (CURRENT) USE OF ANTITHROMBOTICS/ANTIPLATELETS: Status: ACTIVE | Noted: 2020-09-04

## 2020-09-04 PROBLEM — K59.03 DRUG INDUCED CONSTIPATION: Status: ACTIVE | Noted: 2020-09-04

## 2021-03-27 PROBLEM — K57.20 DIVERTICULITIS OF LARGE INTESTINE WITH PERFORATION AND ABSCESS WITHOUT BLEEDING: Status: ACTIVE | Noted: 2021-02-13

## 2021-04-22 ENCOUNTER — HOSPITAL ENCOUNTER (EMERGENCY)
Age: 76
Discharge: HOME | End: 2021-04-23
Attending: EMERGENCY MEDICINE
Payer: MEDICARE

## 2021-04-22 ENCOUNTER — APPOINTMENT (OUTPATIENT)
Dept: CT IMAGING | Age: 76
End: 2021-04-22
Attending: EMERGENCY MEDICINE
Payer: MEDICARE

## 2021-04-22 DIAGNOSIS — N39.0 URINARY TRACT INFECTION WITHOUT HEMATURIA, SITE UNSPECIFIED: Primary | ICD-10-CM

## 2021-04-22 LAB
ALBUMIN SERPL-MCNC: 3.3 G/DL (ref 3.2–4.6)
ALBUMIN/GLOB SERPL: 0.6 {RATIO} (ref 1.2–3.5)
ALP SERPL-CCNC: 88 U/L (ref 50–136)
ALT SERPL-CCNC: 17 U/L (ref 12–65)
ANION GAP SERPL CALC-SCNC: 7 MMOL/L (ref 7–16)
AST SERPL-CCNC: 44 U/L (ref 15–37)
BASOPHILS # BLD: 0 K/UL (ref 0–0.2)
BASOPHILS NFR BLD: 0 % (ref 0–2)
BILIRUB SERPL-MCNC: 0.4 MG/DL (ref 0.2–1.1)
BUN SERPL-MCNC: 12 MG/DL (ref 8–23)
CALCIUM SERPL-MCNC: 10.2 MG/DL (ref 8.3–10.4)
CHLORIDE SERPL-SCNC: 104 MMOL/L (ref 98–107)
CO2 SERPL-SCNC: 26 MMOL/L (ref 21–32)
CREAT SERPL-MCNC: 1.12 MG/DL (ref 0.6–1)
DIFFERENTIAL METHOD BLD: ABNORMAL
EOSINOPHIL # BLD: 0.2 K/UL (ref 0–0.8)
EOSINOPHIL NFR BLD: 1 % (ref 0.5–7.8)
ERYTHROCYTE [DISTWIDTH] IN BLOOD BY AUTOMATED COUNT: 15.9 % (ref 11.9–14.6)
GLOBULIN SER CALC-MCNC: 5.8 G/DL (ref 2.3–3.5)
GLUCOSE SERPL-MCNC: 102 MG/DL (ref 65–100)
HCT VFR BLD AUTO: 45.1 % (ref 35.8–46.3)
HGB BLD-MCNC: 14.8 G/DL (ref 11.7–15.4)
IMM GRANULOCYTES # BLD AUTO: 0.2 K/UL (ref 0–0.5)
IMM GRANULOCYTES NFR BLD AUTO: 1 % (ref 0–5)
INR PPP: 1
LIPASE SERPL-CCNC: 213 U/L (ref 73–393)
LYMPHOCYTES # BLD: 4.5 K/UL (ref 0.5–4.6)
LYMPHOCYTES NFR BLD: 21 % (ref 13–44)
MCH RBC QN AUTO: 30.7 PG (ref 26.1–32.9)
MCHC RBC AUTO-ENTMCNC: 32.8 G/DL (ref 31.4–35)
MCV RBC AUTO: 93.6 FL (ref 79.6–97.8)
MONOCYTES # BLD: 1.3 K/UL (ref 0.1–1.3)
MONOCYTES NFR BLD: 6 % (ref 4–12)
NEUTS SEG # BLD: 15.3 K/UL (ref 1.7–8.2)
NEUTS SEG NFR BLD: 71 % (ref 43–78)
NRBC # BLD: 0 K/UL (ref 0–0.2)
PLATELET # BLD AUTO: 545 K/UL (ref 150–450)
PLATELET COMMENTS,PCOM: SLIGHT
PMV BLD AUTO: 9.8 FL (ref 9.4–12.3)
POTASSIUM SERPL-SCNC: 4.6 MMOL/L (ref 3.5–5.1)
PROT SERPL-MCNC: 9.1 G/DL (ref 6.3–8.2)
PROTHROMBIN TIME: 13.5 SEC (ref 12.5–14.7)
RBC # BLD AUTO: 4.82 M/UL (ref 4.05–5.2)
RBC MORPH BLD: ABNORMAL
RBC MORPH BLD: ABNORMAL
SODIUM SERPL-SCNC: 137 MMOL/L (ref 136–145)
WBC # BLD AUTO: 21.5 K/UL (ref 4.3–11.1)
WBC MORPH BLD: ABNORMAL

## 2021-04-22 PROCEDURE — 74011000636 HC RX REV CODE- 636: Performed by: EMERGENCY MEDICINE

## 2021-04-22 PROCEDURE — 96375 TX/PRO/DX INJ NEW DRUG ADDON: CPT

## 2021-04-22 PROCEDURE — 93005 ELECTROCARDIOGRAM TRACING: CPT | Performed by: EMERGENCY MEDICINE

## 2021-04-22 PROCEDURE — 80053 COMPREHEN METABOLIC PANEL: CPT

## 2021-04-22 PROCEDURE — 81003 URINALYSIS AUTO W/O SCOPE: CPT

## 2021-04-22 PROCEDURE — 85610 PROTHROMBIN TIME: CPT

## 2021-04-22 PROCEDURE — 85025 COMPLETE CBC W/AUTO DIFF WBC: CPT

## 2021-04-22 PROCEDURE — 99285 EMERGENCY DEPT VISIT HI MDM: CPT

## 2021-04-22 PROCEDURE — 83690 ASSAY OF LIPASE: CPT

## 2021-04-22 PROCEDURE — 74011250636 HC RX REV CODE- 250/636: Performed by: EMERGENCY MEDICINE

## 2021-04-22 PROCEDURE — 96365 THER/PROPH/DIAG IV INF INIT: CPT

## 2021-04-22 PROCEDURE — 81015 MICROSCOPIC EXAM OF URINE: CPT

## 2021-04-22 RX ORDER — SODIUM CHLORIDE 0.9 % (FLUSH) 0.9 %
10 SYRINGE (ML) INJECTION
Status: COMPLETED | OUTPATIENT
Start: 2021-04-22 | End: 2021-04-23

## 2021-04-22 RX ORDER — MORPHINE SULFATE 4 MG/ML
4 INJECTION INTRAVENOUS
Status: COMPLETED | OUTPATIENT
Start: 2021-04-22 | End: 2021-04-22

## 2021-04-22 RX ORDER — ONDANSETRON 2 MG/ML
4 INJECTION INTRAMUSCULAR; INTRAVENOUS
Status: COMPLETED | OUTPATIENT
Start: 2021-04-22 | End: 2021-04-22

## 2021-04-22 RX ADMIN — ONDANSETRON 4 MG: 2 INJECTION INTRAMUSCULAR; INTRAVENOUS at 23:42

## 2021-04-22 RX ADMIN — MORPHINE SULFATE 4 MG: 4 INJECTION INTRAVENOUS at 23:42

## 2021-04-22 RX ADMIN — DIATRIZOATE MEGLUMINE AND DIATRIZOATE SODIUM 15 ML: 660; 100 LIQUID ORAL; RECTAL at 23:21

## 2021-04-23 ENCOUNTER — APPOINTMENT (OUTPATIENT)
Dept: CT IMAGING | Age: 76
End: 2021-04-23
Attending: EMERGENCY MEDICINE
Payer: MEDICARE

## 2021-04-23 VITALS
WEIGHT: 117 LBS | HEIGHT: 64 IN | RESPIRATION RATE: 18 BRPM | TEMPERATURE: 98.1 F | BODY MASS INDEX: 19.97 KG/M2 | HEART RATE: 90 BPM | SYSTOLIC BLOOD PRESSURE: 138 MMHG | OXYGEN SATURATION: 100 % | DIASTOLIC BLOOD PRESSURE: 94 MMHG

## 2021-04-23 LAB
ATRIAL RATE: 102 BPM
BACTERIA URNS QL MICRO: 0 /HPF
CALCULATED P AXIS, ECG09: 79 DEGREES
CALCULATED R AXIS, ECG10: -94 DEGREES
CALCULATED T AXIS, ECG11: 76 DEGREES
CASTS URNS QL MICRO: 0 /LPF
DIAGNOSIS, 93000: NORMAL
EPI CELLS #/AREA URNS HPF: NORMAL /HPF
P-R INTERVAL, ECG05: 118 MS
Q-T INTERVAL, ECG07: 350 MS
QRS DURATION, ECG06: 76 MS
QTC CALCULATION (BEZET), ECG08: 456 MS
RBC #/AREA URNS HPF: NORMAL /HPF
VENTRICULAR RATE, ECG03: 102 BPM
WBC URNS QL MICRO: NORMAL /HPF

## 2021-04-23 PROCEDURE — 74011000258 HC RX REV CODE- 258: Performed by: EMERGENCY MEDICINE

## 2021-04-23 PROCEDURE — 74011000636 HC RX REV CODE- 636: Performed by: EMERGENCY MEDICINE

## 2021-04-23 PROCEDURE — 74011000258 HC RX REV CODE- 258

## 2021-04-23 PROCEDURE — 74011250636 HC RX REV CODE- 250/636

## 2021-04-23 PROCEDURE — 74177 CT ABD & PELVIS W/CONTRAST: CPT

## 2021-04-23 RX ORDER — CEFPODOXIME PROXETIL 100 MG/1
100 TABLET, FILM COATED ORAL 2 TIMES DAILY
Qty: 14 TAB | Refills: 0 | Status: SHIPPED | OUTPATIENT
Start: 2021-04-23 | End: 2021-11-29 | Stop reason: ALTCHOICE

## 2021-04-23 RX ORDER — TRAMADOL HYDROCHLORIDE 50 MG/1
50 TABLET ORAL
Qty: 12 TAB | Refills: 0 | Status: SHIPPED | OUTPATIENT
Start: 2021-04-23 | End: 2021-04-26

## 2021-04-23 RX ADMIN — Medication 10 ML: at 00:29

## 2021-04-23 RX ADMIN — IOPAMIDOL 100 ML: 755 INJECTION, SOLUTION INTRAVENOUS at 00:29

## 2021-04-23 RX ADMIN — SODIUM CHLORIDE 100 ML: 900 INJECTION, SOLUTION INTRAVENOUS at 00:29

## 2021-04-23 RX ADMIN — CEFTRIAXONE 1 G: 1 INJECTION, POWDER, FOR SOLUTION INTRAMUSCULAR; INTRAVENOUS at 02:46

## 2021-04-23 NOTE — ED PROVIDER NOTES
Patient presents to the ER with complaints about possible blood in her stool. Reports she has had a longstanding issue with diverticulitis, reports recent hospitalization secondary to this. States she has had some degree of left-sided abdominal pain since then. States she has noticed worsening pain of the past 2 days. Today she is noted some dark black stool. Has had had periods of lightheadedness. Denies any vomiting. Reports chills without fever. The history is provided by the patient. Melena   The current episode started 2 days ago. The problem occurs frequently. The stool is described as soft. Associated symptoms include abdominal pain. Pertinent negatives include no fever, no diarrhea, no vomiting, no chest pain, no coughing and no difficulty breathing. Her past medical history is significant for a recent illness. Her past medical history does not include abdominal surgery or inflammatory bowel disease. Past Medical History:   Diagnosis Date    Arthritis     CAD (coronary artery disease)     MI 8/2017    Essential hypertension 11/27/2017    GERD (gastroesophageal reflux disease)     Hyperlipemia, mixed 11/27/2017    Other emphysema ([facility] [address] 11/27/2017    Other forms of systemic lupus erythematosus ([facility] [address] 11/27/2017    PUD (peptic ulcer disease)     Tobacco abuse 11/27/2017       Past Surgical History:   Procedure Laterality Date    HX HYSTERECTOMY      [place] CARDIAC SURG PROCEDURE UNLIST      STENT         No family history on file.     Social History     Socioeconomic History    Marital status:      Spouse name: Not on file    Number of children: Not on file    Years of education: Not on file    Highest education level: Not on file   Occupational History    Not on file   Social Needs    Financial resource strain: Not on file    Food insecurity     Worry: Not on file     Inability: Not on file    Transportation needs     Medical: Not on file     Non-medical: Not on file Tobacco Use    Smoking status: Current Some Day Smoker     Years: 40.00    Smokeless tobacco: Never Used    Tobacco comment: Pt smokes every \"now and then\". 2/27/20KH   Substance and Sexual Activity    Alcohol use: No    Drug use: Not on file    Sexual activity: Not Currently   Lifestyle    Physical activity     Days per week: Not on file     Minutes per session: Not on file    Stress: Not on file   Relationships    Social connections     Talks on phone: Not on file     Gets together: Not on file     Attends Mandaen service: Not on file     Active member of club or organization: Not on file     Attends meetings of clubs or organizations: Not on file     Relationship status: Not on file    Intimate partner violence     Fear of current or ex partner: Not on file     Emotionally abused: Not on file     Physically abused: Not on file     Forced sexual activity: Not on file   Other Topics Concern    Not on file   Social History Narrative    Not on file         ALLERGIES: Bactrim [sulfamethoprim], Penicillins, and Sulfa (sulfonamide antibiotics)    Review of Systems   Constitutional: Positive for fatigue. Negative for fever and unexpected weight change. HENT: Negative for congestion, dental problem, trouble swallowing and voice change. Eyes: Negative for photophobia and redness. Respiratory: Negative for cough and chest tightness. Cardiovascular: Negative for chest pain and leg swelling. Gastrointestinal: Positive for abdominal pain and melena. Negative for diarrhea and vomiting. Endocrine: Negative for polydipsia, polyphagia and polyuria. Genitourinary: Negative for dysuria, enuresis and urgency. Musculoskeletal: Negative for back pain and gait problem. Skin: Negative for color change and pallor. Neurological: Negative for tremors, facial asymmetry and weakness. Hematological: Negative for adenopathy. Does not bruise/bleed easily.    Psychiatric/Behavioral: Negative for behavioral problems and confusion. All other systems reviewed and are negative. Vitals:    04/22/21 2241   BP: (!) 169/106   Pulse: 100   Resp: 18   Temp: 98.1 °F (36.7 °C)   SpO2: 95%   Weight: 53.1 kg (117 lb)   Height: 5' 4\" (1.626 m)            Physical Exam  Vitals signs and nursing note reviewed. Constitutional:       General: She is not in acute distress. Appearance: Normal appearance. She is not ill-appearing. HENT:      Head: Normocephalic and atraumatic. Nose: Nose normal. No congestion or rhinorrhea. Mouth/Throat:      Mouth: Mucous membranes are moist.   Eyes:      General:         Right eye: No discharge. Left eye: No discharge. Extraocular Movements: Extraocular movements intact. Pupils: Pupils are equal, round, and reactive to light. Neck:      Musculoskeletal: Normal range of motion and neck supple. Cardiovascular:      Rate and Rhythm: Normal rate and regular rhythm. Pulses: Normal pulses. Heart sounds: Normal heart sounds. No murmur. No friction rub. Pulmonary:      Effort: Pulmonary effort is normal.      Breath sounds: Normal breath sounds. No stridor. No wheezing. Abdominal:      General: Abdomen is flat. Palpations: Abdomen is soft. Tenderness: There is abdominal tenderness in the left lower quadrant. Genitourinary:     Rectum: Guaiac result positive. Musculoskeletal:         General: No swelling, tenderness, deformity or signs of injury. Skin:     General: Skin is warm and dry. Coloration: Skin is not pale. Findings: No bruising. Neurological:      General: No focal deficit present. Mental Status: She is alert and oriented to person, place, and time. Cranial Nerves: No cranial nerve deficit. Sensory: No sensory deficit. Psychiatric:         Mood and Affect: Mood normal.          MDM  Number of Diagnoses or Management Options  Diagnosis management comments:  Will check labs here, hemoglobin hematocrit as well as perform rectal exam    11:10 PM  Rectal exam reveals brown stool that is trace heme positive. Her hemoglobin is stable at 14.8 but she does have an elevated white blood cell count of 21,000.    11:42 PM  Plan to obtain CT scan of abdomen and pelvis. Nonacute findings of the CT abdomen pelvis with some thickening of the wall but no diverticulitis or diverticulosis no abscesses or perforations. Does have a UTI elevated white blood cell count does not appear toxic. Will start her on antibiotics and follow-up closely with her primary care doctor. Amount and/or Complexity of Data Reviewed  Clinical lab tests: ordered and reviewed  Tests in the radiology section of CPT®: ordered and reviewed  Decide to obtain previous medical records or to obtain history from someone other than the patient: yes  Review and summarize past medical records: yes  Independent visualization of images, tracings, or specimens: yes    Risk of Complications, Morbidity, and/or Mortality  Presenting problems: moderate  Diagnostic procedures: moderate  Management options: high  General comments: High risk due to use of parenteral narcotic medication    Patient Progress  Patient progress: stable         Procedures        Results Include:    Recent Results (from the past 24 hour(s))   EKG, 12 LEAD, INITIAL    Collection Time: 04/22/21 10:45 PM   Result Value Ref Range    Ventricular Rate 102 BPM    Atrial Rate 102 BPM    P-R Interval 118 ms    QRS Duration 76 ms    Q-T Interval 350 ms    QTC Calculation (Bezet) 456 ms    Calculated P Axis 79 degrees    Calculated R Axis -94 degrees    Calculated T Axis 76 degrees    Diagnosis       !! AGE AND GENDER SPECIFIC ECG ANALYSIS !!   Sinus tachycardia  Right atrial enlargement  Right superior axis deviation  Pulmonary disease pattern  Abnormal ECG  When compared with ECG of 11-JUL-2020 09:28,  QRS axis Shifted left  Nonspecific T wave abnormality no longer evident in Lateral leads     CBC WITH AUTOMATED DIFF    Collection Time: 04/22/21 10:47 PM   Result Value Ref Range    WBC 21.5 (H) 4.3 - 11.1 K/uL    RBC 4.82 4.05 - 5.2 M/uL    HGB 14.8 11.7 - 15.4 g/dL    HCT 45.1 35.8 - 46.3 %    MCV 93.6 79.6 - 97.8 FL    MCH 30.7 26.1 - 32.9 PG    MCHC 32.8 31.4 - 35.0 g/dL    RDW 15.9 (H) 11.9 - 14.6 %    PLATELET 310 (H) 628 - 450 K/uL    MPV 9.8 9.4 - 12.3 FL    ABSOLUTE NRBC 0.00 0.0 - 0.2 K/uL    DF PENDING      Voice dictation software was used during the making of this note. This software is not perfect and grammatical and other typographical errors may be present. This note has been proofread, but may still contain errors.   Joyce Herbert MD; 4/22/2021 @11:12 PM   ===================================================================

## 2021-04-23 NOTE — ED NOTES
I have reviewed discharge instructions with the patient. The patient verbalized understanding. Patient left ED via Discharge Method: ambulatory to Home with friend. Opportunity for questions and clarification provided. Patient given 2 scripts. To continue your aftercare when you leave the hospital, you may receive an automated call from our care team to check in on how you are doing. This is a free service and part of our promise to provide the best care and service to meet your aftercare needs.  If you have questions, or wish to unsubscribe from this service please call [phone]. Thank you for Choosing our [facility] Emergency Department.

## 2021-04-23 NOTE — ED TRIAGE NOTES
Pt to treatment room in wheelchair with mask in place. Pt complains of blood in stool onset this AM. Pt reports the blood is dark red. Reports bleeding only occurs with BM.  Pt complains of accompanying abdominal pain with n/v.

## 2021-05-16 NOTE — PROGRESS NOTES
Bedside and Verbal shift change report given to self (oncoming nurse) by Jasper Diaz (offgoing nurse). Report included the following information SBAR, Kardex, MAR and Recent Results. Patient's right groin visualized small amount of oozing noted borders marked. .

## 2022-03-18 PROBLEM — J43.8 OTHER EMPHYSEMA (HCC): Status: ACTIVE | Noted: 2017-11-27

## 2022-03-18 PROBLEM — Z79.02 LONG TERM (CURRENT) USE OF ANTITHROMBOTICS/ANTIPLATELETS: Status: ACTIVE | Noted: 2020-09-04

## 2022-03-18 PROBLEM — R07.9 CHEST PAIN: Status: ACTIVE | Noted: 2020-07-10

## 2022-03-18 PROBLEM — Z98.61 POST PTCA: Status: ACTIVE | Noted: 2017-11-29

## 2022-03-19 PROBLEM — Z72.0 TOBACCO ABUSE: Status: ACTIVE | Noted: 2017-11-27

## 2022-03-19 PROBLEM — I71.40 ABDOMINAL AORTIC ANEURYSM (AAA) WITHOUT RUPTURE (HCC): Status: ACTIVE | Noted: 2020-08-28

## 2022-03-19 PROBLEM — E78.2 HYPERLIPEMIA, MIXED: Status: ACTIVE | Noted: 2017-11-27

## 2022-03-19 PROBLEM — I10 ESSENTIAL HYPERTENSION: Status: ACTIVE | Noted: 2017-11-27

## 2022-03-19 PROBLEM — K57.20 DIVERTICULITIS OF LARGE INTESTINE WITH PERFORATION AND ABSCESS WITHOUT BLEEDING: Status: ACTIVE | Noted: 2021-02-13

## 2022-03-19 PROBLEM — M32.8 OTHER FORMS OF SYSTEMIC LUPUS ERYTHEMATOSUS (HCC): Status: ACTIVE | Noted: 2017-11-27

## 2022-03-20 PROBLEM — K59.03 DRUG INDUCED CONSTIPATION: Status: ACTIVE | Noted: 2020-09-04

## 2022-03-20 PROBLEM — I25.10 CORONARY ARTERY DISEASE INVOLVING NATIVE CORONARY ARTERY OF NATIVE HEART: Status: ACTIVE | Noted: 2017-11-27

## 2022-07-06 ENCOUNTER — TELEPHONE (OUTPATIENT)
Dept: VASCULAR SURGERY | Age: 77
End: 2022-07-06

## 2022-08-11 ENCOUNTER — OFFICE VISIT (OUTPATIENT)
Dept: CARDIOLOGY CLINIC | Age: 77
End: 2022-08-11
Payer: MEDICARE

## 2022-08-11 VITALS
SYSTOLIC BLOOD PRESSURE: 136 MMHG | BODY MASS INDEX: 24.07 KG/M2 | WEIGHT: 141 LBS | HEART RATE: 80 BPM | DIASTOLIC BLOOD PRESSURE: 76 MMHG | HEIGHT: 64 IN

## 2022-08-11 DIAGNOSIS — I25.10 CORONARY ARTERY DISEASE INVOLVING NATIVE CORONARY ARTERY OF NATIVE HEART WITHOUT ANGINA PECTORIS: Primary | ICD-10-CM

## 2022-08-11 DIAGNOSIS — I10 ESSENTIAL HYPERTENSION: ICD-10-CM

## 2022-08-11 DIAGNOSIS — E78.2 HYPERLIPEMIA, MIXED: ICD-10-CM

## 2022-08-11 DIAGNOSIS — Z72.0 TOBACCO ABUSE: ICD-10-CM

## 2022-08-11 DIAGNOSIS — I71.40 ABDOMINAL AORTIC ANEURYSM (AAA) WITHOUT RUPTURE: ICD-10-CM

## 2022-08-11 PROCEDURE — G8420 CALC BMI NORM PARAMETERS: HCPCS | Performed by: INTERNAL MEDICINE

## 2022-08-11 PROCEDURE — 1090F PRES/ABSN URINE INCON ASSESS: CPT | Performed by: INTERNAL MEDICINE

## 2022-08-11 PROCEDURE — 99214 OFFICE O/P EST MOD 30 MIN: CPT | Performed by: INTERNAL MEDICINE

## 2022-08-11 PROCEDURE — G8427 DOCREV CUR MEDS BY ELIG CLIN: HCPCS | Performed by: INTERNAL MEDICINE

## 2022-08-11 PROCEDURE — 1123F ACP DISCUSS/DSCN MKR DOCD: CPT | Performed by: INTERNAL MEDICINE

## 2022-08-11 PROCEDURE — G8400 PT W/DXA NO RESULTS DOC: HCPCS | Performed by: INTERNAL MEDICINE

## 2022-08-11 PROCEDURE — 3017F COLORECTAL CA SCREEN DOC REV: CPT | Performed by: INTERNAL MEDICINE

## 2022-08-11 PROCEDURE — 4004F PT TOBACCO SCREEN RCVD TLK: CPT | Performed by: INTERNAL MEDICINE

## 2022-08-11 ASSESSMENT — ENCOUNTER SYMPTOMS: SHORTNESS OF BREATH: 0

## 2022-08-11 NOTE — PROGRESS NOTES
[address] [place], [place]  [address], [address], [place] [address]  [address]  PHONE: 5122 [facility]  1945      SUBJECTIVE:   Leslie Valladares is a 78 y.o. female seen for a follow up visit regarding the following:     Chief Complaint   Patient presents with    Coronary Artery Disease    Hypertension       HPI:    Dyspnea on exertion. 3 liters. Leg pain in right leg. On methadone but MD . Now new doctor. Leslie Valladares presents for routine follow up. Multiple issues addressed. Coronary Artery Disease:  Patient denies any recent chest pain. Stable VILLEGAS. Notes compliance with medical therapy. No recent NTG use. No intolerance to anti-platelet therapy. Hypertension:  Ambulatory BP readings have been controlled. Patient reports compliance with medical therapy without side effects. Hyperlipidemia:   Unable to tolerate therapy with statins/Repatha. Pain control:  Working with pain management. Has a new PCP as her last one passed away. Told that she has some type of viral infection. She cannot tell me why or what viral infection she has. Review of records show that she had negative RSV, COVID and influenza test recently. PVDz: Has continued claudication symptoms. Sees vascular surgery. Limited options      Past Medical History, Past Surgical History, Family history, Social History, and Medications were all reviewed with the patient today and updated as necessary.            Current Outpatient Medications:     Multiple Vitamin (MULTIVITAMIN ADULT PO), Take by mouth, Disp: , Rfl:     Cyanocobalamin (VITAMIN B 12 PO), Take by mouth, Disp: , Rfl:     aspirin 81 MG chewable tablet, Take 81 mg by mouth daily, Disp: , Rfl:     vitamin D3 (CHOLECALCIFEROL) 125 MCG (5000 UT) TABS tablet, Take 2,000 Units by mouth daily, Disp: , Rfl:     clopidogrel (PLAVIX) 75 MG tablet, Take 75 mg by mouth daily, Disp: , Rfl:     fluticasone (FLONASE) 50 MCG/ACT nasal spray, 2 sprays by Nasal route daily, Disp: , Rfl:     fluticasone (FLOVENT HFA) 220 MCG/ACT inhaler, Inhale into the lungs, Disp: , Rfl:     ipratropium-albuterol (DUONEB) 0.5-2.5 (3) MG/3ML SOLN nebulizer solution, Inhale 3 mLs into the lungs as needed, Disp: , Rfl:     albuterol-ipratropium (COMBIVENT RESPIMAT)  MCG/ACT AERS inhaler, Inhale 1 puff into the lungs every 6 hours, Disp: , Rfl:     methadone (DOLOPHINE) 10 MG tablet, Take 10 mg by mouth 2 times daily. , Disp: , Rfl:     metoprolol succinate (TOPROL XL) 50 MG extended release tablet, Take 50 mg by mouth daily, Disp: , Rfl:     nitroGLYCERIN (NITROSTAT) 0.4 MG SL tablet, Place 1 sl under the tongue q 5 min prn cp, max 3 sl in a 15-min time period. Call 911 if no relief after the 3rd sl., Disp: , Rfl:     pantoprazole (PROTONIX) 40 MG tablet, Take 40 mg by mouth daily, Disp: , Rfl:      Allergies   Allergen Reactions    Penicillins Rash    Sulfa Antibiotics Rash    Sulfamethoxazole-Trimethoprim Rash        Patient Active Problem List    Diagnosis Date Noted    Chest pain 07/10/2020     Priority: High    Post PTCA 11/29/2017     Priority: High    Diverticulitis of large intestine with perforation and abscess without bleeding 02/13/2021    Long term (current) use of antithrombotics/antiplatelets 85/30/0526    Drug induced constipation 09/04/2020    Abdominal aortic aneurysm (AAA) without rupture ([facility] [address] 08/28/2020     1. CTA of abdomen (8/26/20):  3.8 cm aneurysm. 2.  AAA US (8/6/21):  4.2 x 4.2 cm. Infrarenal aneurysm. Other emphysema ([address] 11/27/2017    Hyperlipemia, mixed 11/27/2017     Intolerant to statins. Unable to tolerate Repatha. Other forms of systemic lupus erythematosus ([address] 11/27/2017    Tobacco abuse 11/27/2017    Essential hypertension 11/27/2017    Coronary artery disease involving native coronary artery of native heart 11/27/2017     1. PCI of OM:  Unknown stent type  2.   PCI of distal RCA (11/29/17): Synergy 2.5 x 16 mm ABDIRAHMAN. Normal FFR of   LAD and Diagonal.   3.  Lexiscan Cardiolite (7/12/18):  EF 68%. Normal perfusion. 4.  LHC (7/10/20):  POBA of OM1 at very tortous segment of artery with 3.0   mm balloon. Patent OM and RCA stents. 5.  Lexiscan Cardiolite (12/28/21) :  Normal perfusion. Unable to assess   EF  6.  Echo (1/13/2022): EF 60-65%. Normal RV. No significant valvular   disease            Social History     Tobacco Use    Smoking status: Some Days    Smokeless tobacco: Never    Tobacco comments:     Quit smoking: Pt smokes every \"now and then\". 2/27/20KH   Substance Use Topics    Alcohol use: No       ROS:    Review of Systems   Constitutional: Negative for malaise/fatigue. Cardiovascular:  Positive for dyspnea on exertion. Negative for chest pain. Respiratory:  Negative for shortness of breath. Hematologic/Lymphatic: Negative for bleeding problem. Does not bruise/bleed easily. Musculoskeletal:  Positive for arthritis. Neurological:  Negative for focal weakness. Psychiatric/Behavioral:  Negative for depression. PHYSICAL EXAM:  Wt Readings from Last 3 Encounters:   08/11/22 141 lb (64 kg)   03/18/22 143 lb (64.9 kg)   02/11/22 143 lb 3.2 oz (65 kg)     BP Readings from Last 3 Encounters:   08/11/22 136/76   03/18/22 (!) 162/92   02/11/22 130/88     Pulse Readings from Last 3 Encounters:   08/11/22 80   03/18/22 86   02/11/22 92       Physical Exam  Constitutional:       General: She is not in acute distress. Appearance: Normal appearance. Neck:      Vascular: No carotid bruit. Cardiovascular:      Rate and Rhythm: Normal rate and regular rhythm. Pulmonary:      Breath sounds: No wheezing. Abdominal:      General: There is no distension. Palpations: Abdomen is soft. Musculoskeletal:         General: No swelling. Skin:     General: Skin is warm and dry. Neurological:      General: No focal deficit present.    Psychiatric:         Mood and Affect: Mood dictated using speech recognition software.   As a result, error of speech recognition may have occurred

## 2022-10-19 ENCOUNTER — OFFICE VISIT (OUTPATIENT)
Dept: VASCULAR SURGERY | Age: 77
End: 2022-10-19
Payer: MEDICARE

## 2022-10-19 VITALS
WEIGHT: 139.7 LBS | HEIGHT: 64 IN | SYSTOLIC BLOOD PRESSURE: 145 MMHG | OXYGEN SATURATION: 90 % | HEART RATE: 98 BPM | DIASTOLIC BLOOD PRESSURE: 84 MMHG | BODY MASS INDEX: 23.85 KG/M2

## 2022-10-19 DIAGNOSIS — Z72.0 TOBACCO ABUSE: ICD-10-CM

## 2022-10-19 DIAGNOSIS — I71.43 INFRARENAL ABDOMINAL AORTIC ANEURYSM (AAA) WITHOUT RUPTURE: Primary | ICD-10-CM

## 2022-10-19 DIAGNOSIS — I73.9 PAD (PERIPHERAL ARTERY DISEASE) (HCC): ICD-10-CM

## 2022-10-19 PROCEDURE — 1090F PRES/ABSN URINE INCON ASSESS: CPT | Performed by: NURSE PRACTITIONER

## 2022-10-19 PROCEDURE — 1123F ACP DISCUSS/DSCN MKR DOCD: CPT | Performed by: NURSE PRACTITIONER

## 2022-10-19 PROCEDURE — G8400 PT W/DXA NO RESULTS DOC: HCPCS | Performed by: NURSE PRACTITIONER

## 2022-10-19 PROCEDURE — 4004F PT TOBACCO SCREEN RCVD TLK: CPT | Performed by: NURSE PRACTITIONER

## 2022-10-19 PROCEDURE — G8420 CALC BMI NORM PARAMETERS: HCPCS | Performed by: NURSE PRACTITIONER

## 2022-10-19 PROCEDURE — G8484 FLU IMMUNIZE NO ADMIN: HCPCS | Performed by: NURSE PRACTITIONER

## 2022-10-19 PROCEDURE — 99213 OFFICE O/P EST LOW 20 MIN: CPT | Performed by: NURSE PRACTITIONER

## 2022-10-19 PROCEDURE — 3017F COLORECTAL CA SCREEN DOC REV: CPT | Performed by: NURSE PRACTITIONER

## 2022-10-19 PROCEDURE — G8427 DOCREV CUR MEDS BY ELIG CLIN: HCPCS | Performed by: NURSE PRACTITIONER

## 2022-10-19 RX ORDER — CYCLOBENZAPRINE HCL 5 MG
5 TABLET ORAL DAILY
COMMUNITY
Start: 2022-10-11

## 2022-10-19 ASSESSMENT — PATIENT HEALTH QUESTIONNAIRE - PHQ9
SUM OF ALL RESPONSES TO PHQ QUESTIONS 1-9: 0
1. LITTLE INTEREST OR PLEASURE IN DOING THINGS: 0
SUM OF ALL RESPONSES TO PHQ QUESTIONS 1-9: 0
SUM OF ALL RESPONSES TO PHQ9 QUESTIONS 1 & 2: 0
SUM OF ALL RESPONSES TO PHQ QUESTIONS 1-9: 0
SUM OF ALL RESPONSES TO PHQ QUESTIONS 1-9: 0
2. FEELING DOWN, DEPRESSED OR HOPELESS: 0

## 2022-10-19 NOTE — PROGRESS NOTES
DATE OF VISIT: 10/19/2022      [facility]  Nathan Veronica is a 78 y.o. female who follows up for her 6 month arterial duplex study. She c/o abdominal pain and lower extremity pain when walking. She does occasionally smoke. She is on ASA and Plavix. MEDICAL HISTORY:   Past Medical History:   Diagnosis Date    Arthritis     CAD (coronary artery disease)     MI 8/2017    Essential hypertension 11/27/2017    GERD (gastroesophageal reflux disease)     Hyperlipemia, mixed 11/27/2017    Other emphysema ([facility] [address] 11/27/2017    Other forms of systemic lupus erythematosus ([facility] [address] 11/27/2017    PUD (peptic ulcer disease)     Tobacco abuse 11/27/2017         SURGICAL HISTORY:   Past Surgical History:   Procedure Laterality Date    HYSTERECTOMY (CERVIX STATUS UNKNOWN)      LA CARDIAC SURG PROCEDURE UNLIST      STENT       Review of Systems:  Constitutional:   Negative for fevers and unexplained weight loss. Respiratory:   Negative for hemoptysis. Cardiovascular:   Negative except as noted in HPI. Musculoskeletal:  Negative for active, unexplained/severe joint pain.       ALLERGIES:   Allergies   Allergen Reactions    Penicillins Rash    Sulfa Antibiotics Rash    Sulfamethoxazole-Trimethoprim Rash       CURRENT MEDICATIONS:  Current Outpatient Medications   Medication Sig Dispense Refill    cyclobenzaprine (FLEXERIL) 5 MG tablet Take 5 mg by mouth Daily      Multiple Vitamin (MULTIVITAMIN ADULT PO) Take by mouth      Cyanocobalamin (VITAMIN B 12 PO) Take by mouth      aspirin 81 MG chewable tablet Take 81 mg by mouth daily      vitamin D3 (CHOLECALCIFEROL) 125 MCG (5000 UT) TABS tablet Take 2,000 Units by mouth daily      clopidogrel (PLAVIX) 75 MG tablet Take 75 mg by mouth daily      fluticasone (FLONASE) 50 MCG/ACT nasal spray 2 sprays by Nasal route daily      fluticasone (FLOVENT HFA) 220 MCG/ACT inhaler Inhale into the lungs      ipratropium-albuterol (DUONEB) 0.5-2.5 (3) MG/3ML SOLN nebulizer solution Inhale 3 mLs into the lungs as needed      albuterol-ipratropium (COMBIVENT RESPIMAT)  MCG/ACT AERS inhaler Inhale 1 puff into the lungs every 6 hours      methadone (DOLOPHINE) 10 MG tablet Take 10 mg by mouth 2 times daily. metoprolol succinate (TOPROL XL) 50 MG extended release tablet Take 50 mg by mouth daily      nitroGLYCERIN (NITROSTAT) 0.4 MG SL tablet Place 1 sl under the tongue q 5 min prn cp, max 3 sl in a 15-min time period. Call 911 if no relief after the 3rd sl.      pantoprazole (PROTONIX) 40 MG tablet Take 40 mg by mouth daily       No current facility-administered medications for this visit. IMAGING: Interpretation Summary         The abdominal aorta appears patent. B-mode image demonstrates an infrarenal abdominal aortic aneurysm. It measures 4.5cm AP x 4.5cm transverse at its maximum diameter. The aneurysm does not involve the iliac bifurcation. Procedure Details    A gray scale, color Doppler imaging and spectral Doppler analysis ultrasound was performed. During the study longitudinal and transverse views were obtained. Pulsed wave doppler was performed. Overall the study quality was adequate. Study was technically difficult due to: bowel gas. Abdominal Findings      Abdominal Aorta    Infrarenal abdominal aortic aneurysm (AAA) was visualized. Abdominal Aorta Measurements     PSV AP TR   Prox Ao 79.3 cm/s        3.6 cm        3.6 cm          Mid Ao 41.6 cm/s        3.5 cm        3.1 cm          Dist Ao 48.3 cm/s        4.5 cm        4.5 cm            Iliac Artery Measurements     PSV AP TR   Rt ELAYNE     1.6 cm        1.6 cm          Rt ELAYNE Prox 95.9 cm/s            Lt ELAYNE     1.5 cm        1.5 cm          Lt ELAYNE Prox 52.9 cm/s                                      Comparison Study Information    Prior Study    The exam was compared to the study performed on 3/18/2022.  B-mode image demonstrates an infrarenal abdominal aortic aneurysm measuring 4.1cm AP x 4.0cm transverse at its maximum diameter. Procedure Staff    Technologist/Clinician: Linsey Salinas  Supporting Staff: None  Performing Physician/Midlevel: None     Exam Completion Date/Time: 10/19/22  2:16 PM      Interpretation Summary         Right side findings: Resting KORI is 0.68. The lower extremity arterial duplex reveals mild, diffuse atherosclerosis with a <50% stenosis of the SFA (multilevel). The great toe pressure is 77mmHg. Left side findings: Resting KORI is 0.44. The lower extremity arterial duplex reveals mild, diffuse atherosclerosis with an occlusion of the distal SFA. The great toe pressure is 47mmHg. Procedure Details    A gray scale, color Doppler imaging and spectral Doppler analysis ultrasound was performed. During the study longitudinal views were obtained. Pulsed wave doppler, pulsed volume recording (PVR) and photo plethysmography was performed. Overall the study quality was good. Lower Extremity Arterial Findings    Right Lower Arterial    Proximal Common Femoral Artery: Multiphasic Doppler waveforms. Profunda Artery: Multiphasic Doppler waveforms. Superficial Femoral Artery: <50% stenosis. Proximal Superficial Femoral Artery: Multiphasic Doppler waveforms. Middle Superficial Femoral Artery: Multiphasic Doppler waveforms. Distal Superficial Femoral Artery: Multiphasic Doppler waveforms. Proximal Popliteal Artery: Multiphasic Doppler waveforms. Distal Popliteal Artery: Multiphasic Doppler waveforms. Anterior Tibial Artery: Monophasic Doppler waveforms. Posterior Tibial Artery: Multiphasic Doppler waveforms. Peroneal Artery: monophasic Doppler waveforms. Left Lower Arterial    Proximal Common Femoral Artery: Multiphasic Doppler waveforms. Profunda Artery: Multiphasic Doppler waveforms. .   Proximal Superficial Femoral Artery: Multiphasic Doppler waveforms. Middle Superficial Femoral Artery: Multiphasic Doppler waveforms.    Distal Superficial Femoral Artery: Absent Doppler waveforms. Proximal Popliteal Artery: Monophasic Doppler waveforms. Distal Popliteal Artery: Monophasic Doppler waveforms. Anterior Tibial Artery: Monophasic Doppler waveforms. Posterior Tibial Artery: Monophasic Doppler waveforms. Peroneal Artery: Monophasic Doppler waveforms.      Right Lower Arterial Measurements     PSV Mitch Ratio   CFA Prox 143 cm/s           PFA Prox 77 cm/s           SFA Prox 135 cm/s        0.9          SFA Mid 162 cm/s        1.2          SFA Dist 149 cm/s        0.92          Pop Prox 117 cm/s        0.79          Pop Dist 87.7 cm/s              PTA Dist 48.7 cm/s           EMI Dist 26.9 cm/s           Katherine Dist 28.2 cm/s             Left Lower Arterial Measurements     PSV Mitch Ratio   CFA Prox 196 cm/s           PFA Prox 153 cm/s           SFA Prox 76.3 cm/s        0.39          SFA Mid 290 cm/s        3.8          SFA Dist 0 cm/s        0          Pop Prox 14.5 cm/s              Pop Dist 30.9 cm/s              PTA Dist 12.5 cm/s           EMI Dist 18.9 cm/s           Katherine Dist 7.5 cm/s             Arterial Pressure Measurements     Right Left   Brachial  mmHg        145 mmHg          Post Tibial BP 98 mmHg        51 mmHg          Dorslis Pedis BP 80 mmHg        64 mmHg          KORI 0.68 ratio        0.44 ratio          Toe Pressure 77 mmHg        47 mmHg          TBI 0.53 ratio        0.32 ratio                                    Procedure Staff    Technologist/Clinician: Ligia Garcia  Supporting Staff: None  Performing Physician/Midlevel: None     Exam Completion Date/Time: 10/19/22  2:20 PM  PHYSICAL EXAM  VITALS: BP (!) 145/84 (Site: Left Upper Arm, Position: Sitting, Cuff Size: Medium Adult)   Pulse 98   Ht 5' 4\" (1.626 m)   Wt 139 lb 11.2 oz (63.4 kg)   SpO2 90%   BMI 23.98 kg/m²       GENERAL: Well developed, well nourished, in NAD  HEAD/NECK: normocephalic, atraumatic, neck supple  HEART: Regular rate and rhythm  ABDOMEN: soft, nontender, nondistended  EXTREMITIES: upper without CCE with palpable radial and brachial pulses. Lower extremities: non-palpable distal pulses  MUSCULOSKELETAL: normal gait  NEURO: sensation and strength grossly intact and symmetrical  PSYCH: alert and oriented to person, place and time      Assessment/Plan: Patient is a 79-year-old female who follows up for her 6-month arterial and aorta duplex study. She does complain of back pain and bilateral lower extremity pain. She unfortunately continues to smoke occasionally. She is on aspirin and Plavix. Her abdominal aorta has increased from 4.1 to 4.5 cm. She also has had a decrease in her toe pressure on the right with an SFA occlusion. I will order a CTA abdomen with runoff and will have her return to discuss the results with Dr. Andrea Mathews. I have strongly counseled her on the need for complete smoking cessation. In the interim continue aspirin and Plavix.         Franchesca Conn, APRN - CNP    20 minutes of time was spent on this encounter including chart review, assessment and evaluation

## 2022-10-20 ENCOUNTER — TELEPHONE (OUTPATIENT)
Dept: CARDIOLOGY CLINIC | Age: 77
End: 2022-10-20

## 2022-10-20 NOTE — TELEPHONE ENCOUNTER
Reviewed her note from vascular surgery. Her abdominal aortic aneurysm has increased in size. They recommended CTA and for her to follow-up with their office.   Would agree with this plan     Patient called with Dr. Jaclyn Mascorro response patient voiced understanding//[email or web address]

## 2022-10-20 NOTE — TELEPHONE ENCOUNTER
Patient called and was wondering what Paras Le says about her aorta aneurysm. Patient would just like to know what her next steps are. Please call and advise.

## 2022-10-25 ENCOUNTER — TELEPHONE (OUTPATIENT)
Dept: VASCULAR SURGERY | Age: 77
End: 2022-10-25

## 2022-10-25 ENCOUNTER — HOSPITAL ENCOUNTER (OUTPATIENT)
Dept: CT IMAGING | Age: 77
Discharge: HOME | End: 2022-10-28
Payer: MEDICARE

## 2022-10-25 DIAGNOSIS — I73.9 PAD (PERIPHERAL ARTERY DISEASE) (HCC): ICD-10-CM

## 2022-10-25 DIAGNOSIS — Z72.0 TOBACCO ABUSE: ICD-10-CM

## 2022-10-25 DIAGNOSIS — I71.43 INFRARENAL ABDOMINAL AORTIC ANEURYSM (AAA) WITHOUT RUPTURE: ICD-10-CM

## 2022-10-25 LAB — CREAT BLD-MCNC: 1.11 MG/DL (ref 0.8–1.5)

## 2022-10-25 PROCEDURE — 75635 CT ANGIO ABDOMINAL ARTERIES: CPT

## 2022-10-25 PROCEDURE — 82565 ASSAY OF CREATININE: CPT

## 2022-10-25 PROCEDURE — 6360000004 HC RX CONTRAST MEDICATION: Performed by: NURSE PRACTITIONER

## 2022-10-25 PROCEDURE — 2580000003 HC RX 258: Performed by: NURSE PRACTITIONER

## 2022-10-25 RX ORDER — 0.9 % SODIUM CHLORIDE 0.9 %
100 INTRAVENOUS SOLUTION INTRAVENOUS
Status: COMPLETED | OUTPATIENT
Start: 2022-10-25 | End: 2022-10-25

## 2022-10-25 RX ORDER — SODIUM CHLORIDE 0.9 % (FLUSH) 0.9 %
10 SYRINGE (ML) INJECTION
Status: COMPLETED | OUTPATIENT
Start: 2022-10-25 | End: 2022-10-25

## 2022-10-25 RX ADMIN — SODIUM CHLORIDE, PRESERVATIVE FREE 10 ML: 5 INJECTION INTRAVENOUS at 11:01

## 2022-10-25 RX ADMIN — IOPAMIDOL 100 ML: 755 INJECTION, SOLUTION INTRAVENOUS at 11:01

## 2022-10-25 RX ADMIN — SODIUM CHLORIDE 100 ML: 9 INJECTION, SOLUTION INTRAVENOUS at 11:01

## 2022-10-25 NOTE — TELEPHONE ENCOUNTER
Patient called with concern of having pain in left side of stomache for the last 3 days. She had her CT scan today at hospital, but test has not been read yet. Per Mendoza Alexander NP, informed patient that we do not have the results yet. If pain gets bad, patient advised to go to ER to get checked out. Patient has appointment with Dr Andrea Matehws on 10/28/22 to review the scan. Patient verbally understood.

## 2022-10-28 ENCOUNTER — OFFICE VISIT (OUTPATIENT)
Dept: VASCULAR SURGERY | Age: 77
End: 2022-10-28
Payer: MEDICARE

## 2022-10-28 VITALS
SYSTOLIC BLOOD PRESSURE: 160 MMHG | OXYGEN SATURATION: 94 % | DIASTOLIC BLOOD PRESSURE: 97 MMHG | HEART RATE: 101 BPM | HEIGHT: 64 IN | WEIGHT: 139 LBS | BODY MASS INDEX: 23.73 KG/M2

## 2022-10-28 DIAGNOSIS — N28.89 LEFT KIDNEY MASS: ICD-10-CM

## 2022-10-28 DIAGNOSIS — I73.9 PVD (PERIPHERAL VASCULAR DISEASE) WITH CLAUDICATION (HCC): Primary | ICD-10-CM

## 2022-10-28 PROCEDURE — 3078F DIAST BP <80 MM HG: CPT | Performed by: SURGERY

## 2022-10-28 PROCEDURE — G8427 DOCREV CUR MEDS BY ELIG CLIN: HCPCS | Performed by: SURGERY

## 2022-10-28 PROCEDURE — G8400 PT W/DXA NO RESULTS DOC: HCPCS | Performed by: SURGERY

## 2022-10-28 PROCEDURE — 99213 OFFICE O/P EST LOW 20 MIN: CPT | Performed by: SURGERY

## 2022-10-28 PROCEDURE — 1123F ACP DISCUSS/DSCN MKR DOCD: CPT | Performed by: SURGERY

## 2022-10-28 PROCEDURE — 3075F SYST BP GE 130 - 139MM HG: CPT | Performed by: SURGERY

## 2022-10-28 PROCEDURE — 1090F PRES/ABSN URINE INCON ASSESS: CPT | Performed by: SURGERY

## 2022-10-28 PROCEDURE — 3017F COLORECTAL CA SCREEN DOC REV: CPT | Performed by: SURGERY

## 2022-10-28 PROCEDURE — 4004F PT TOBACCO SCREEN RCVD TLK: CPT | Performed by: SURGERY

## 2022-10-28 PROCEDURE — G8484 FLU IMMUNIZE NO ADMIN: HCPCS | Performed by: SURGERY

## 2022-10-28 PROCEDURE — G8420 CALC BMI NORM PARAMETERS: HCPCS | Performed by: SURGERY

## 2022-10-28 NOTE — PROGRESS NOTES
[address]   [address] [facility] FAX: [phone]    Galina Pittman  : 1945    Chief Complaint:     History of Present Illness:   Patient follows up today for follow-up CTA for increase in aneurysm. Patient denies abdominal pain or back pain. CURRENT MEDICATIONS:  Current Outpatient Medications   Medication Sig Dispense Refill    cyclobenzaprine (FLEXERIL) 5 MG tablet Take 5 mg by mouth Daily      Multiple Vitamin (MULTIVITAMIN ADULT PO) Take by mouth      Cyanocobalamin (VITAMIN B 12 PO) Take by mouth      aspirin 81 MG chewable tablet Take 81 mg by mouth daily      vitamin D3 (CHOLECALCIFEROL) 125 MCG (5000 UT) TABS tablet Take 2,000 Units by mouth daily      clopidogrel (PLAVIX) 75 MG tablet Take 75 mg by mouth daily      fluticasone (FLONASE) 50 MCG/ACT nasal spray 2 sprays by Nasal route daily      fluticasone (FLOVENT HFA) 220 MCG/ACT inhaler Inhale into the lungs      ipratropium-albuterol (DUONEB) 0.5-2.5 (3) MG/3ML SOLN nebulizer solution Inhale 3 mLs into the lungs as needed      albuterol-ipratropium (COMBIVENT RESPIMAT)  MCG/ACT AERS inhaler Inhale 1 puff into the lungs every 6 hours      methadone (DOLOPHINE) 10 MG tablet Take 10 mg by mouth 2 times daily. metoprolol succinate (TOPROL XL) 50 MG extended release tablet Take 50 mg by mouth daily      nitroGLYCERIN (NITROSTAT) 0.4 MG SL tablet Place 1 sl under the tongue q 5 min prn cp, max 3 sl in a 15-min time period. Call 911 if no relief after the 3rd sl.      pantoprazole (PROTONIX) 40 MG tablet Take 40 mg by mouth daily       No current facility-administered medications for this visit.        Past Medical History:   Diagnosis Date    Arthritis     CAD (coronary artery disease)     MI 2017    Essential hypertension 2017    GERD (gastroesophageal reflux disease)     Hyperlipemia, mixed 2017    Other emphysema ([facility] [address] 2017    Other forms of systemic lupus erythematosus ([address] 11/27/2017    PUD (peptic ulcer disease)     Tobacco abuse 11/27/2017       Physical Examination:   Height: 5' 4\" (1.626 m), Weight: 139 lb (63 kg), BP: (!) 160/97    Constitutional: she appears well-developed. No distress. HENT:   Head: Atraumatic. Eyes: Pupils are equal, round, and reactive to light. Neck: Normal range of motion. Cardiovascular: Regular rhythm. Pulmonary/Chest: Effort normal and breath sounds normal. No respiratory distress. Abdominal: Soft. Bowel sounds are normal. she exhibits no distension. There is no tenderness. There is no guarding. No hernia. Musculoskeletal: Normal range of motion. Neurological: She is alert. CN II- XII grossly intact   Vascular: Palpable femoral pulses    Imaging:    CTA show diffuse atherosclerotic disease and aorta with aneurysm measuring 4.2 cm. There is significant mount of atherosclerotic disease throughout even above the renals. Left enhancing mass of the upper pole of the left kidney      Recommendations/Plans:   Ms. Tiana Ortega is a 78y.o. year old female with a 4.2 cm aneurysm. From vascular standpoint see her back in 6 months with duplex study. We will refer her over to urologist for evaluation of a left renal kidney mass. Mery Bethea MD    Elements of this note have been dictated using speech recognition software. As a result, errors of speech recognition may have occurred.     30 minutes of time was spent on this encounter including chart review, assessment, evaluation and coordination of patient care

## 2022-12-07 ENCOUNTER — TELEPHONE (OUTPATIENT)
Dept: VASCULAR SURGERY | Age: 77
End: 2022-12-07

## 2022-12-07 NOTE — TELEPHONE ENCOUNTER
Pt c/o L-side (middle)pain since last week    **per Dr Jona Johnson, he wanted to know if she has seen Urology yet    -per pt she has not been contacted by Urology   We called Urology today and told them pt was referred back in October and if they would call her today for an appt    -Dr Reji Verduzco contacted Dr Molly Asher his self about this patient

## 2022-12-19 ENCOUNTER — OFFICE VISIT (OUTPATIENT)
Dept: UROLOGY | Age: 77
End: 2022-12-19
Payer: MEDICARE

## 2022-12-19 DIAGNOSIS — N28.89 RENAL MASS: Primary | ICD-10-CM

## 2022-12-19 LAB
BILIRUBIN, URINE, POC: NEGATIVE
BLOOD URINE, POC: NEGATIVE
GLUCOSE URINE, POC: NEGATIVE
KETONES, URINE, POC: NEGATIVE
LEUKOCYTE ESTERASE, URINE, POC: NORMAL
NITRITE, URINE, POC: NEGATIVE
PH, URINE, POC: 5.5 (ref 4.6–8)
PROTEIN,URINE, POC: NEGATIVE
SPECIFIC GRAVITY, URINE, POC: 1.02 (ref 1–1.03)
URINALYSIS CLARITY, POC: NORMAL
URINALYSIS COLOR, POC: NORMAL
UROBILINOGEN, POC: NORMAL

## 2022-12-19 PROCEDURE — 3017F COLORECTAL CA SCREEN DOC REV: CPT | Performed by: UROLOGY

## 2022-12-19 PROCEDURE — G8427 DOCREV CUR MEDS BY ELIG CLIN: HCPCS | Performed by: UROLOGY

## 2022-12-19 PROCEDURE — G8484 FLU IMMUNIZE NO ADMIN: HCPCS | Performed by: UROLOGY

## 2022-12-19 PROCEDURE — G8420 CALC BMI NORM PARAMETERS: HCPCS | Performed by: UROLOGY

## 2022-12-19 PROCEDURE — 1090F PRES/ABSN URINE INCON ASSESS: CPT | Performed by: UROLOGY

## 2022-12-19 PROCEDURE — G8400 PT W/DXA NO RESULTS DOC: HCPCS | Performed by: UROLOGY

## 2022-12-19 PROCEDURE — 4004F PT TOBACCO SCREEN RCVD TLK: CPT | Performed by: UROLOGY

## 2022-12-19 PROCEDURE — 81003 URINALYSIS AUTO W/O SCOPE: CPT | Performed by: UROLOGY

## 2022-12-19 PROCEDURE — 1123F ACP DISCUSS/DSCN MKR DOCD: CPT | Performed by: UROLOGY

## 2022-12-19 PROCEDURE — 99204 OFFICE O/P NEW MOD 45 MIN: CPT | Performed by: UROLOGY

## 2022-12-19 ASSESSMENT — ENCOUNTER SYMPTOMS
WHEEZING: 1
SHORTNESS OF BREATH: 1
BACK PAIN: 1
ABDOMINAL PAIN: 1
EYES NEGATIVE: 1

## 2022-12-19 NOTE — PROGRESS NOTES
[facility]  [place], [address] [facility]  [phone]    Sorin Asher  : 1945      Chief Complaint   Patient presents with    New Patient    Mass        HPI   78 y.o., female who is referred by Dr. Beronica Louis for evaluation of renal mass. Recent CTA on 10/25/22 showed a 1.5cm enhancing LMP anterior renal mass. Also noted in a 4.1cm AAA which is being observed by Dr. Beronica Louis. Denies any recent hematuria or flank pain. Cr was 1.11 on 10/25/22. She is on ASA and Plavix for cardiac stents (last placed in  per her report). Mild smoker.         Past Medical History:   Diagnosis Date    Arthritis     CAD (coronary artery disease)     MI 2017    Essential hypertension 2017    GERD (gastroesophageal reflux disease)     Hyperlipemia, mixed 2017    Other emphysema ([facility] [address] 2017    Other forms of systemic lupus erythematosus ([facility] [address] 2017    PUD (peptic ulcer disease)     Tobacco abuse 2017     Past Surgical History:   Procedure Laterality Date    HYSTERECTOMY (CERVIX STATUS UNKNOWN)      WA CARDIAC SURG PROCEDURE UNLIST      STENT     Current Outpatient Medications   Medication Sig Dispense Refill    cyclobenzaprine (FLEXERIL) 5 MG tablet Take 5 mg by mouth Daily      Multiple Vitamin (MULTIVITAMIN ADULT PO) Take by mouth      Cyanocobalamin (VITAMIN B 12 PO) Take by mouth      aspirin 81 MG chewable tablet Take 81 mg by mouth daily      vitamin D3 (CHOLECALCIFEROL) 125 MCG (5000 UT) TABS tablet Take 2,000 Units by mouth daily      clopidogrel (PLAVIX) 75 MG tablet Take 75 mg by mouth daily      fluticasone (FLONASE) 50 MCG/ACT nasal spray 2 sprays by Nasal route daily      fluticasone (FLOVENT HFA) 220 MCG/ACT inhaler Inhale into the lungs      ipratropium-albuterol (DUONEB) 0.5-2.5 (3) MG/3ML SOLN nebulizer solution Inhale 3 mLs into the lungs as needed      albuterol-ipratropium (COMBIVENT RESPIMAT)  MCG/ACT AERS inhaler Inhale 1 puff into the lungs every 6 hours      methadone (DOLOPHINE) 10 MG tablet Take 10 mg by mouth 2 times daily. metoprolol succinate (TOPROL XL) 50 MG extended release tablet Take 50 mg by mouth daily      nitroGLYCERIN (NITROSTAT) 0.4 MG SL tablet Place 1 sl under the tongue q 5 min prn cp, max 3 sl in a 15-min time period. Call 911 if no relief after the 3rd sl.      pantoprazole (PROTONIX) 40 MG tablet Take 40 mg by mouth daily       No current facility-administered medications for this visit. Allergies   Allergen Reactions    Penicillins Rash    Sulfa Antibiotics Rash    Sulfamethoxazole-Trimethoprim Rash     Social History     Socioeconomic History    Marital status:      Spouse name: Not on file    Number of children: Not on file    Years of education: Not on file    Highest education level: Not on file   Occupational History    Not on file   Tobacco Use    Smoking status: Some Days    Smokeless tobacco: Never    Tobacco comments:     Quit smoking: Pt smokes every \"now and then\". 2/27/20   Substance and Sexual Activity    Alcohol use: No    Drug use: Not on file    Sexual activity: Not on file   Other Topics Concern    Not on file   Social History Narrative    Not on file     Social Determinants of Health     Financial Resource Strain: Not on file   Food Insecurity: Not on file   Transportation Needs: Not on file   Physical Activity: Not on file   Stress: Not on file   Social Connections: Not on file   Intimate Partner Violence: Not on file   Housing Stability: Not on file     History reviewed. No pertinent family history. Review of Systems  Review of Systems  Constitutional: Positive for headaches. Skin: Negative skin ROS  Eyes: Eyes negative  ENT: Positive for pain swallowing. Respiratory: Positive for shortness of breath and wheezing. Cardiovascular: Positive for chest pain, hypertension and leg pain. GI: Positive for abdominal pain.   Genitourinary: Positive for recurrent UTIs and nocturia. Musculoskeletal: Positive for back pain, bone pain, arthralgias and neck pain. Neurological: Positive for dizziness and focal weakness. Psychological: Positive for depression. Endocrine: Positive for thirst and fatigue. Hem/Lymphatic: Positive for easy bleeding, easy bruising and frequent infections. Physical Exam  There were no vitals taken for this visit. General appearance - alert, well appearing, and in no distress  Mental status - alert, oriented to person, place, and time  Eyes - extraocular eye movements intact, sclera anicteric  Nose - normal and patent, no erythema, discharge or polyps  Mouth - mucous membranes moist  Abdomen - soft, nontender, nondistended, no masses or organomegaly  Lymphatic-  No palpable lymphadenopathy  Neurological -  normal speech, no focal findings or movement disorder noted  Musculoskeletal - no deformity or swelling  Extremities - no pedal edema, no clubbing or cyanosis  Skin - normal coloration and turgor      Urinalysis  UA - Dipstick  Results for orders placed or performed in visit on 12/19/22   AMB POC URINALYSIS DIP STICK AUTO W/O MICRO   Result Value Ref Range    Color (UA POC)      Clarity (UA POC)      Glucose, Urine, POC Negative Negative    Bilirubin, Urine, POC Negative Negative    KETONES, Urine, POC Negative Negative    Specific Gravity, Urine, POC 1.020 1.001 - 1.035    Blood (UA POC) Negative Negative    pH, Urine, POC 5.5 4.6 - 8.0    Protein, Urine, POC Negative Negative    Urobilinogen, POC 0.2 mg/dL     Nitrite, Urine, POC Negative Negative    Leukocyte Esterase, Urine, POC Small Negative       UA - Micro  WBC - 0  RBC - 0  Bacteria - 0  Epith - 0    Assessment/Plan    ICD-10-CM    1. Renal mass  N28.89 AMB POC URINALYSIS DIP STICK AUTO W/O MICRO     CT ABDOMEN RENAL MASS Additional Contrast? None        All tx options reviewed including observation, biopsy, ablation, partial nephrectomy and radical nephrectomy.   She elected for observation given small size of lesion. RTO in 6 mo with renal mass protocol prior.     MOSHE LOPEZ, DO

## 2023-02-16 ENCOUNTER — TELEPHONE (OUTPATIENT)
Dept: NEUROLOGY | Age: 78
End: 2023-02-16

## 2023-02-16 NOTE — TELEPHONE ENCOUNTER
Patient showed up at our office for her Cardiology Appointment. I have been unable to get in touch with your office. Patient is going home since she is in [facility] and does not have enough time to make it to your office. Please call to reschedule.   For some reason she thought she was told to come to our office, [facility], [address] [facility]

## 2023-02-17 ENCOUNTER — TELEPHONE (OUTPATIENT)
Dept: CARDIOLOGY CLINIC | Age: 78
End: 2023-02-17

## 2023-02-17 NOTE — TELEPHONE ENCOUNTER
Pt called and was wondering if she needs some type of ultra scan done before coming for an upcoming suhail.   Please call pt back with any update in care.

## 2023-02-17 NOTE — TELEPHONE ENCOUNTER
Patient called and informed that she does not have an ultrasound with Dr Demetris Hitchcock, but she does have an appointment with DR. Kash Cabrera with ultrasound 5-5-23 at 9 am and see DR Kash Cabrera right after. Patient voiced understanding. I rescheduled appointment with patient for missed appointment with Dr. Demetris Hitchcock for 3-29-23 at 2 pm MA.  Patient vocied understanding and thanked me//oir

## 2023-03-21 ENCOUNTER — APPOINTMENT (OUTPATIENT)
Dept: GENERAL RADIOLOGY | Age: 78
End: 2023-03-21
Payer: MEDICARE

## 2023-03-21 ENCOUNTER — HOSPITAL ENCOUNTER (EMERGENCY)
Age: 78
Discharge: HOME | End: 2023-03-21
Attending: EMERGENCY MEDICINE
Payer: MEDICARE

## 2023-03-21 ENCOUNTER — TELEPHONE (OUTPATIENT)
Dept: CARDIOLOGY CLINIC | Age: 78
End: 2023-03-21

## 2023-03-21 VITALS
BODY MASS INDEX: 23.56 KG/M2 | OXYGEN SATURATION: 93 % | WEIGHT: 138 LBS | DIASTOLIC BLOOD PRESSURE: 80 MMHG | SYSTOLIC BLOOD PRESSURE: 154 MMHG | TEMPERATURE: 98.5 F | HEART RATE: 77 BPM | HEIGHT: 64 IN | RESPIRATION RATE: 18 BRPM

## 2023-03-21 DIAGNOSIS — J44.9 CHRONIC OBSTRUCTIVE PULMONARY DISEASE, UNSPECIFIED COPD TYPE (HCC): ICD-10-CM

## 2023-03-21 DIAGNOSIS — R07.89 CHEST WALL PAIN: Primary | ICD-10-CM

## 2023-03-21 LAB
ALBUMIN SERPL-MCNC: 3.2 G/DL (ref 3.2–4.6)
ALBUMIN/GLOB SERPL: 0.7 (ref 0.4–1.6)
ALP SERPL-CCNC: 71 U/L (ref 50–136)
ALT SERPL-CCNC: 9 U/L (ref 12–65)
ANION GAP SERPL CALC-SCNC: 3 MMOL/L (ref 2–11)
AST SERPL-CCNC: 24 U/L (ref 15–37)
BILIRUB SERPL-MCNC: 0.3 MG/DL (ref 0.2–1.1)
BUN SERPL-MCNC: 15 MG/DL (ref 8–23)
CALCIUM SERPL-MCNC: 9 MG/DL (ref 8.3–10.4)
CHLORIDE SERPL-SCNC: 106 MMOL/L (ref 101–110)
CO2 SERPL-SCNC: 28 MMOL/L (ref 21–32)
CREAT SERPL-MCNC: 1.2 MG/DL (ref 0.6–1)
EKG ATRIAL RATE: 69 BPM
EKG DIAGNOSIS: NORMAL
EKG P AXIS: 81 DEGREES
EKG P-R INTERVAL: 133 MS
EKG Q-T INTERVAL: 425 MS
EKG QRS DURATION: 79 MS
EKG QTC CALCULATION (BAZETT): 456 MS
EKG R AXIS: 67 DEGREES
EKG T AXIS: 70 DEGREES
EKG VENTRICULAR RATE: 69 BPM
ERYTHROCYTE [DISTWIDTH] IN BLOOD BY AUTOMATED COUNT: 18.4 % (ref 11.9–14.6)
GLOBULIN SER CALC-MCNC: 4.6 G/DL (ref 2.8–4.5)
GLUCOSE SERPL-MCNC: 94 MG/DL (ref 65–100)
HCT VFR BLD AUTO: 39.7 % (ref 35.8–46.3)
HGB BLD-MCNC: 12.1 G/DL (ref 11.7–15.4)
MAGNESIUM SERPL-MCNC: 1.8 MG/DL (ref 1.8–2.4)
MCH RBC QN AUTO: 25.3 PG (ref 26.1–32.9)
MCHC RBC AUTO-ENTMCNC: 30.5 G/DL (ref 31.4–35)
MCV RBC AUTO: 83.1 FL (ref 82–102)
NRBC # BLD: 0 K/UL (ref 0–0.2)
PLATELET # BLD AUTO: 347 K/UL (ref 150–450)
PMV BLD AUTO: 10.7 FL (ref 9.4–12.3)
POTASSIUM SERPL-SCNC: 4.4 MMOL/L (ref 3.5–5.1)
PROT SERPL-MCNC: 7.8 G/DL (ref 6.3–8.2)
RBC # BLD AUTO: 4.78 M/UL (ref 4.05–5.2)
SODIUM SERPL-SCNC: 137 MMOL/L (ref 133–143)
TROPONIN I SERPL HS-MCNC: 3.7 PG/ML (ref 0–37)
TROPONIN I SERPL HS-MCNC: 4.5 PG/ML (ref 0–37)
WBC # BLD AUTO: 10.8 K/UL (ref 4.3–11.1)

## 2023-03-21 PROCEDURE — 6370000000 HC RX 637 (ALT 250 FOR IP): Performed by: EMERGENCY MEDICINE

## 2023-03-21 PROCEDURE — 80053 COMPREHEN METABOLIC PANEL: CPT

## 2023-03-21 PROCEDURE — 71045 X-RAY EXAM CHEST 1 VIEW: CPT

## 2023-03-21 PROCEDURE — 94664 DEMO&/EVAL PT USE INHALER: CPT

## 2023-03-21 PROCEDURE — 83735 ASSAY OF MAGNESIUM: CPT

## 2023-03-21 PROCEDURE — 85027 COMPLETE CBC AUTOMATED: CPT

## 2023-03-21 PROCEDURE — 96375 TX/PRO/DX INJ NEW DRUG ADDON: CPT

## 2023-03-21 PROCEDURE — 93005 ELECTROCARDIOGRAM TRACING: CPT | Performed by: EMERGENCY MEDICINE

## 2023-03-21 PROCEDURE — 99285 EMERGENCY DEPT VISIT HI MDM: CPT

## 2023-03-21 PROCEDURE — 84484 ASSAY OF TROPONIN QUANT: CPT

## 2023-03-21 PROCEDURE — 94640 AIRWAY INHALATION TREATMENT: CPT

## 2023-03-21 PROCEDURE — 96374 THER/PROPH/DIAG INJ IV PUSH: CPT

## 2023-03-21 PROCEDURE — 6360000002 HC RX W HCPCS: Performed by: EMERGENCY MEDICINE

## 2023-03-21 PROCEDURE — 2700000000 HC OXYGEN THERAPY PER DAY

## 2023-03-21 RX ORDER — ASPIRIN 81 MG/1
162 TABLET, CHEWABLE ORAL DAILY
Status: DISCONTINUED | OUTPATIENT
Start: 2023-03-21 | End: 2023-03-21 | Stop reason: HOSPADM

## 2023-03-21 RX ORDER — IPRATROPIUM BROMIDE AND ALBUTEROL SULFATE 2.5; .5 MG/3ML; MG/3ML
1 SOLUTION RESPIRATORY (INHALATION)
Status: COMPLETED | OUTPATIENT
Start: 2023-03-21 | End: 2023-03-21

## 2023-03-21 RX ORDER — PREDNISONE 20 MG/1
20 TABLET ORAL 2 TIMES DAILY
Qty: 10 TABLET | Refills: 0 | Status: SHIPPED | OUTPATIENT
Start: 2023-03-21 | End: 2023-03-22 | Stop reason: SDUPTHER

## 2023-03-21 RX ORDER — METHYLPREDNISOLONE SODIUM SUCCINATE 125 MG/2ML
125 INJECTION, POWDER, LYOPHILIZED, FOR SOLUTION INTRAMUSCULAR; INTRAVENOUS
Status: COMPLETED | OUTPATIENT
Start: 2023-03-21 | End: 2023-03-21

## 2023-03-21 RX ORDER — MORPHINE SULFATE 4 MG/ML
2 INJECTION INTRAVENOUS ONCE
Status: COMPLETED | OUTPATIENT
Start: 2023-03-21 | End: 2023-03-21

## 2023-03-21 RX ORDER — PYRAZINAMIDE 500 MG/1
1 TABLET ORAL EVERY 6 HOURS PRN
Qty: 20 TABLET | Refills: 0 | Status: SHIPPED | OUTPATIENT
Start: 2023-03-21 | End: 2023-03-22 | Stop reason: SDUPTHER

## 2023-03-21 RX ADMIN — MORPHINE SULFATE 2 MG: 4 INJECTION, SOLUTION INTRAMUSCULAR; INTRAVENOUS at 15:46

## 2023-03-21 RX ADMIN — ASPIRIN 81 MG 162 MG: 81 TABLET ORAL at 13:30

## 2023-03-21 RX ADMIN — METHYLPREDNISOLONE SODIUM SUCCINATE 125 MG: 125 INJECTION, POWDER, FOR SOLUTION INTRAMUSCULAR; INTRAVENOUS at 14:02

## 2023-03-21 RX ADMIN — IPRATROPIUM BROMIDE AND ALBUTEROL SULFATE 1 AMPULE: .5; 3 SOLUTION RESPIRATORY (INHALATION) at 14:19

## 2023-03-21 ASSESSMENT — PAIN SCALES - GENERAL
PAINLEVEL_OUTOF10: 10
PAINLEVEL_OUTOF10: 8

## 2023-03-21 ASSESSMENT — PAIN DESCRIPTION - LOCATION
LOCATION: CHEST
LOCATION: CHEST

## 2023-03-21 ASSESSMENT — ENCOUNTER SYMPTOMS
SHORTNESS OF BREATH: 1
CHEST TIGHTNESS: 1

## 2023-03-21 ASSESSMENT — PAIN DESCRIPTION - ORIENTATION: ORIENTATION: LEFT

## 2023-03-21 ASSESSMENT — PAIN - FUNCTIONAL ASSESSMENT: PAIN_FUNCTIONAL_ASSESSMENT: 0-10

## 2023-03-21 ASSESSMENT — PAIN DESCRIPTION - DESCRIPTORS: DESCRIPTORS: ACHING

## 2023-03-21 NOTE — ED TRIAGE NOTES
Patient arrives to ED via EMS from home. Patient reports chest pain since last Thursday. Patient reports she had an abnormal chest xray with nodules. Patient reports history of AAA without rupture. Patient reports SOB with exertion. Patient has history of COPD.

## 2023-03-21 NOTE — TELEPHONE ENCOUNTER
Spoke to pt. States she has had constant \"hard pains\" in her left chest since Thursday. Has been told she has abnormal cxr with nodules. States she doesn't know if sx are cardiac or not.  Due to active chest pain, recommended pt go to [facility] ER for eval.

## 2023-03-21 NOTE — DISCHARGE INSTRUCTIONS
Take the course of steroids as prescribed. Use your breathing treatments as needed. Take the least amount of Tylenol with codeine as possible for severe pain. Risk of opioid analgesics include, but are not limited to: Overdose they can stop or slow your breathing and lead to death; fractures from falls; drowsiness leading to injury; tolerance, dependence and addiction. You should not operate any motorized vehicles or work from a height greater than ground level when taking opioid analgesics as they increase your fall risks.

## 2023-03-21 NOTE — ED PROVIDER NOTES
clips are noted in the left axilla. The bones are  unremarkable. Impression    No acute abnormality. CBC   Result Value Ref Range    WBC 10.8 4.3 - 11.1 K/uL    RBC 4.78 4.05 - 5.2 M/uL    Hemoglobin 12.1 11.7 - 15.4 g/dL    Hematocrit 39.7 35.8 - 46.3 %    MCV 83.1 82 - 102 FL    MCH 25.3 (L) 26.1 - 32.9 PG    MCHC 30.5 (L) 31.4 - 35.0 g/dL    RDW 18.4 (H) 11.9 - 14.6 %    Platelets 269 949 - 252 K/uL    MPV 10.7 9.4 - 12.3 FL    nRBC 0.00 0.0 - 0.2 K/uL   Comprehensive Metabolic Panel   Result Value Ref Range    Sodium 137 133 - 143 mmol/L    Potassium 4.4 3.5 - 5.1 mmol/L    Chloride 106 101 - 110 mmol/L    CO2 28 21 - 32 mmol/L    Anion Gap 3 2 - 11 mmol/L    Glucose 94 65 - 100 mg/dL    BUN 15 8 - 23 MG/DL    Creatinine 1.20 (H) 0.6 - 1.0 MG/DL    Est, Glom Filt Rate 47 (L) >60 ml/min/1.73m2    Calcium 9.0 8.3 - 10.4 MG/DL    Total Bilirubin 0.3 0.2 - 1.1 MG/DL    ALT 9 (L) 12 - 65 U/L    AST 24 15 - 37 U/L    Alk Phosphatase 71 50 - 136 U/L    Total Protein 7.8 6.3 - 8.2 g/dL    Albumin 3.2 3.2 - 4.6 g/dL    Globulin 4.6 (H) 2.8 - 4.5 g/dL    Albumin/Globulin Ratio 0.7 0.4 - 1.6     Troponin   Result Value Ref Range    Troponin, High Sensitivity 3.7 0 - 37 pg/mL   Magnesium   Result Value Ref Range    Magnesium 1.8 1.8 - 2.4 mg/dL   EKG 12 Lead   Result Value Ref Range    Ventricular Rate 69 BPM    Atrial Rate 69 BPM    P-R Interval 133 ms    QRS Duration 79 ms    Q-T Interval 425 ms    QTc Calculation (Bazett) 456 ms    P Axis 81 degrees    R Axis 67 degrees    T Axis 70 degrees    Diagnosis Sinus rhythm         XR CHEST PORTABLE   Final Result   No acute abnormality. Voice dictation software was used during the making of this note. This software is not perfect and grammatical and other typographical errors may be present. This note has not been completely proofread for errors.        Juaquin Cervantes DO  03/21/23 154

## 2023-03-22 RX ORDER — PREDNISONE 20 MG/1
20 TABLET ORAL 2 TIMES DAILY
Qty: 10 TABLET | Refills: 0 | Status: SHIPPED | OUTPATIENT
Start: 2023-03-22 | End: 2023-03-27

## 2023-03-22 RX ORDER — PYRAZINAMIDE 500 MG/1
1 TABLET ORAL EVERY 6 HOURS PRN
Qty: 20 TABLET | Refills: 0 | Status: SHIPPED | OUTPATIENT
Start: 2023-03-22 | End: 2023-03-27

## 2023-03-29 ENCOUNTER — OFFICE VISIT (OUTPATIENT)
Dept: CARDIOLOGY CLINIC | Age: 78
End: 2023-03-29
Payer: MEDICARE

## 2023-03-29 VITALS
DIASTOLIC BLOOD PRESSURE: 58 MMHG | WEIGHT: 137.2 LBS | HEART RATE: 82 BPM | HEIGHT: 64 IN | SYSTOLIC BLOOD PRESSURE: 118 MMHG | BODY MASS INDEX: 23.42 KG/M2

## 2023-03-29 DIAGNOSIS — I25.10 CORONARY ARTERY DISEASE INVOLVING NATIVE CORONARY ARTERY OF NATIVE HEART WITHOUT ANGINA PECTORIS: Primary | ICD-10-CM

## 2023-03-29 DIAGNOSIS — E78.2 HYPERLIPEMIA, MIXED: ICD-10-CM

## 2023-03-29 DIAGNOSIS — I10 ESSENTIAL HYPERTENSION: ICD-10-CM

## 2023-03-29 DIAGNOSIS — R07.89 OTHER CHEST PAIN: ICD-10-CM

## 2023-03-29 DIAGNOSIS — I71.43 INFRARENAL ABDOMINAL AORTIC ANEURYSM (AAA) WITHOUT RUPTURE (HCC): ICD-10-CM

## 2023-03-29 PROCEDURE — 3078F DIAST BP <80 MM HG: CPT | Performed by: INTERNAL MEDICINE

## 2023-03-29 PROCEDURE — 4004F PT TOBACCO SCREEN RCVD TLK: CPT | Performed by: INTERNAL MEDICINE

## 2023-03-29 PROCEDURE — G8400 PT W/DXA NO RESULTS DOC: HCPCS | Performed by: INTERNAL MEDICINE

## 2023-03-29 PROCEDURE — G8427 DOCREV CUR MEDS BY ELIG CLIN: HCPCS | Performed by: INTERNAL MEDICINE

## 2023-03-29 PROCEDURE — G8484 FLU IMMUNIZE NO ADMIN: HCPCS | Performed by: INTERNAL MEDICINE

## 2023-03-29 PROCEDURE — G8420 CALC BMI NORM PARAMETERS: HCPCS | Performed by: INTERNAL MEDICINE

## 2023-03-29 PROCEDURE — 99214 OFFICE O/P EST MOD 30 MIN: CPT | Performed by: INTERNAL MEDICINE

## 2023-03-29 PROCEDURE — 1090F PRES/ABSN URINE INCON ASSESS: CPT | Performed by: INTERNAL MEDICINE

## 2023-03-29 PROCEDURE — 3017F COLORECTAL CA SCREEN DOC REV: CPT | Performed by: INTERNAL MEDICINE

## 2023-03-29 PROCEDURE — 1123F ACP DISCUSS/DSCN MKR DOCD: CPT | Performed by: INTERNAL MEDICINE

## 2023-03-29 PROCEDURE — 3074F SYST BP LT 130 MM HG: CPT | Performed by: INTERNAL MEDICINE

## 2023-03-29 RX ORDER — METOPROLOL SUCCINATE 50 MG/1
75 TABLET, EXTENDED RELEASE ORAL DAILY
Qty: 45 TABLET | Refills: 5 | Status: SHIPPED | OUTPATIENT
Start: 2023-03-29

## 2023-03-29 ASSESSMENT — ENCOUNTER SYMPTOMS: SHORTNESS OF BREATH: 0

## 2023-03-29 NOTE — PROGRESS NOTES
75 mg by mouth daily, Disp: , Rfl:     fluticasone (FLONASE) 50 MCG/ACT nasal spray, 2 sprays by Nasal route daily, Disp: , Rfl:     fluticasone (FLOVENT HFA) 220 MCG/ACT inhaler, Inhale into the lungs, Disp: , Rfl:     ipratropium-albuterol (DUONEB) 0.5-2.5 (3) MG/3ML SOLN nebulizer solution, Inhale 3 mLs into the lungs as needed, Disp: , Rfl:     albuterol-ipratropium (COMBIVENT RESPIMAT)  MCG/ACT AERS inhaler, Inhale 1 puff into the lungs every 6 hours, Disp: , Rfl:     methadone (DOLOPHINE) 10 MG tablet, Take 10 mg by mouth 2 times daily. , Disp: , Rfl:     metoprolol succinate (TOPROL XL) 50 MG extended release tablet, Take 75 mg by mouth daily, Disp: , Rfl:     nitroGLYCERIN (NITROSTAT) 0.4 MG SL tablet, Place 1 sl under the tongue q 5 min prn cp, max 3 sl in a 15-min time period. Call 911 if no relief after the 3rd sl., Disp: , Rfl:     pantoprazole (PROTONIX) 40 MG tablet, Take 40 mg by mouth daily, Disp: , Rfl:      Allergies   Allergen Reactions    Penicillins Rash    Sulfa Antibiotics Rash    Sulfamethoxazole-Trimethoprim Rash        Patient Active Problem List    Diagnosis Date Noted    Chest pain 07/10/2020     Priority: High    Post PTCA 11/29/2017     Priority: High    PAD (peripheral artery disease) ([facility] [address] 10/19/2022     Priority: Medium    Diverticulitis of large intestine with perforation and abscess without bleeding 02/13/2021     Priority: Low    Long term (current) use of antithrombotics/antiplatelets 09/54/2397     Priority: Low    Drug induced constipation 09/04/2020     Priority: Low    Abdominal aortic aneurysm (AAA) without rupture 08/28/2020     Priority: Low     1. CTA of abdomen (8/26/20):  3.8 cm aneurysm. 2.  AAA US (8/6/21):  4.2 x 4.2 cm. Infrarenal aneurysm. Other emphysema ([facility] [address] 11/27/2017     Priority: Low    Hyperlipemia, mixed 11/27/2017     Priority: Low     Intolerant to statins. Unable to tolerate Repatha.          Other forms of systemic lupus

## 2023-05-05 ENCOUNTER — OFFICE VISIT (OUTPATIENT)
Dept: VASCULAR SURGERY | Age: 78
End: 2023-05-05
Payer: MEDICARE

## 2023-05-05 VITALS
BODY MASS INDEX: 22.71 KG/M2 | HEART RATE: 87 BPM | WEIGHT: 133 LBS | DIASTOLIC BLOOD PRESSURE: 91 MMHG | HEIGHT: 64 IN | SYSTOLIC BLOOD PRESSURE: 146 MMHG

## 2023-05-05 DIAGNOSIS — I73.9 PVD (PERIPHERAL VASCULAR DISEASE) WITH CLAUDICATION (HCC): Primary | ICD-10-CM

## 2023-05-05 PROCEDURE — G8427 DOCREV CUR MEDS BY ELIG CLIN: HCPCS | Performed by: SURGERY

## 2023-05-05 PROCEDURE — G8400 PT W/DXA NO RESULTS DOC: HCPCS | Performed by: SURGERY

## 2023-05-05 PROCEDURE — 1123F ACP DISCUSS/DSCN MKR DOCD: CPT | Performed by: SURGERY

## 2023-05-05 PROCEDURE — 1090F PRES/ABSN URINE INCON ASSESS: CPT | Performed by: SURGERY

## 2023-05-05 PROCEDURE — 3080F DIAST BP >= 90 MM HG: CPT | Performed by: SURGERY

## 2023-05-05 PROCEDURE — 3017F COLORECTAL CA SCREEN DOC REV: CPT | Performed by: SURGERY

## 2023-05-05 PROCEDURE — 3077F SYST BP >= 140 MM HG: CPT | Performed by: SURGERY

## 2023-05-05 PROCEDURE — 99213 OFFICE O/P EST LOW 20 MIN: CPT | Performed by: SURGERY

## 2023-05-05 PROCEDURE — 4004F PT TOBACCO SCREEN RCVD TLK: CPT | Performed by: SURGERY

## 2023-05-05 PROCEDURE — G8420 CALC BMI NORM PARAMETERS: HCPCS | Performed by: SURGERY

## 2023-05-05 NOTE — PROGRESS NOTES
[address]   [address] [facility] FAX: [phone]    Joice Cushing  : 1945    Chief Complaint:     History of Present Illness:   Patient follows up today for follow-up duplex study. Patient has history of aneurysm disease. Patient denies abdominal pain or back pain. CURRENT MEDICATIONS:  Current Outpatient Medications   Medication Sig Dispense Refill    metoprolol succinate (TOPROL XL) 50 MG extended release tablet Take 1.5 tablets by mouth daily 45 tablet 5    cyclobenzaprine (FLEXERIL) 5 MG tablet Take 1 tablet by mouth Daily      Multiple Vitamin (MULTIVITAMIN ADULT PO) Take by mouth      Cyanocobalamin (VITAMIN B 12 PO) Take by mouth      aspirin 81 MG chewable tablet Take 1 tablet by mouth daily      vitamin D3 (CHOLECALCIFEROL) 125 MCG (5000 UT) TABS tablet Take 2,000 Units by mouth daily      clopidogrel (PLAVIX) 75 MG tablet Take 1 tablet by mouth daily      fluticasone (FLONASE) 50 MCG/ACT nasal spray 2 sprays by Nasal route daily      fluticasone (FLOVENT HFA) 220 MCG/ACT inhaler Inhale into the lungs      ipratropium-albuterol (DUONEB) 0.5-2.5 (3) MG/3ML SOLN nebulizer solution Inhale 3 mLs into the lungs as needed      albuterol-ipratropium (COMBIVENT RESPIMAT)  MCG/ACT AERS inhaler Inhale 1 puff into the lungs in the morning and 1 puff at noon and 1 puff in the evening and 1 puff before bedtime. methadone (DOLOPHINE) 10 MG tablet Take 1 tablet by mouth 2 times daily. nitroGLYCERIN (NITROSTAT) 0.4 MG SL tablet Place 1 sl under the tongue q 5 min prn cp, max 3 sl in a 15-min time period. Call 911 if no relief after the 3rd sl.      pantoprazole (PROTONIX) 40 MG tablet Take 1 tablet by mouth daily       No current facility-administered medications for this visit.        Past Medical History:   Diagnosis Date    Arthritis     CAD (coronary artery disease)     MI 2017    Essential hypertension 2017    GERD (gastroesophageal

## 2023-05-17 ENCOUNTER — TELEPHONE (OUTPATIENT)
Dept: VASCULAR SURGERY | Age: 78
End: 2023-05-17

## 2023-05-17 NOTE — TELEPHONE ENCOUNTER
Pt stated she went to [facility] yesterday b/c of leg pain,swelling ,barely could walk, was told she had a bld clot and infection in L-leg, was given ABX and told she is already on a Bld thinner    ** per Dr Gant Flavors should be ok, If things get worse call us back    -informed pt of this

## 2023-07-06 ENCOUNTER — HOSPITAL ENCOUNTER (OUTPATIENT)
Dept: CT IMAGING | Age: 78
Discharge: HOME | End: 2023-07-06
Attending: UROLOGY
Payer: MEDICARE

## 2023-07-06 DIAGNOSIS — N28.89 RENAL MASS: ICD-10-CM

## 2023-07-06 LAB — CREAT BLD-MCNC: 1.21 MG/DL (ref 0.8–1.5)

## 2023-07-06 PROCEDURE — 82565 ASSAY OF CREATININE: CPT

## 2023-07-06 PROCEDURE — 6360000004 HC RX CONTRAST MEDICATION: Performed by: UROLOGY

## 2023-07-06 PROCEDURE — 2580000003 HC RX 258: Performed by: UROLOGY

## 2023-07-06 PROCEDURE — 74170 CT ABD WO CNTRST FLWD CNTRST: CPT

## 2023-07-06 RX ORDER — SODIUM CHLORIDE 0.9 % (FLUSH) 0.9 %
10 SYRINGE (ML) INJECTION
Status: COMPLETED | OUTPATIENT
Start: 2023-07-06 | End: 2023-07-06

## 2023-07-06 RX ORDER — 0.9 % SODIUM CHLORIDE 0.9 %
100 INTRAVENOUS SOLUTION INTRAVENOUS
Status: COMPLETED | OUTPATIENT
Start: 2023-07-06 | End: 2023-07-06

## 2023-07-06 RX ADMIN — SODIUM CHLORIDE 100 ML: 9 INJECTION, SOLUTION INTRAVENOUS at 14:54

## 2023-07-06 RX ADMIN — SODIUM CHLORIDE, PRESERVATIVE FREE 10 ML: 5 INJECTION INTRAVENOUS at 14:54

## 2023-07-06 RX ADMIN — IOPAMIDOL 100 ML: 755 INJECTION, SOLUTION INTRAVENOUS at 14:54

## 2023-07-13 ENCOUNTER — OFFICE VISIT (OUTPATIENT)
Dept: UROLOGY | Age: 78
End: 2023-07-13
Payer: MEDICARE

## 2023-07-13 DIAGNOSIS — N30.01 ACUTE CYSTITIS WITH HEMATURIA: ICD-10-CM

## 2023-07-13 DIAGNOSIS — N28.89 RENAL MASS: Primary | ICD-10-CM

## 2023-07-13 LAB
BILIRUBIN, URINE, POC: NEGATIVE
BLOOD URINE, POC: NORMAL
GLUCOSE URINE, POC: NEGATIVE
KETONES, URINE, POC: NORMAL
LEUKOCYTE ESTERASE, URINE, POC: NORMAL
NITRITE, URINE, POC: NEGATIVE
PH, URINE, POC: 5.5 (ref 4.6–8)
PROTEIN,URINE, POC: 30
SPECIFIC GRAVITY, URINE, POC: 1.03 (ref 1–1.03)
URINALYSIS CLARITY, POC: NORMAL
URINALYSIS COLOR, POC: NORMAL
UROBILINOGEN, POC: NORMAL

## 2023-07-13 PROCEDURE — 99214 OFFICE O/P EST MOD 30 MIN: CPT | Performed by: UROLOGY

## 2023-07-13 PROCEDURE — G8427 DOCREV CUR MEDS BY ELIG CLIN: HCPCS | Performed by: UROLOGY

## 2023-07-13 PROCEDURE — 4004F PT TOBACCO SCREEN RCVD TLK: CPT | Performed by: UROLOGY

## 2023-07-13 PROCEDURE — 3017F COLORECTAL CA SCREEN DOC REV: CPT | Performed by: UROLOGY

## 2023-07-13 PROCEDURE — 81003 URINALYSIS AUTO W/O SCOPE: CPT | Performed by: UROLOGY

## 2023-07-13 PROCEDURE — G8420 CALC BMI NORM PARAMETERS: HCPCS | Performed by: UROLOGY

## 2023-07-13 PROCEDURE — 1090F PRES/ABSN URINE INCON ASSESS: CPT | Performed by: UROLOGY

## 2023-07-13 PROCEDURE — G8400 PT W/DXA NO RESULTS DOC: HCPCS | Performed by: UROLOGY

## 2023-07-13 PROCEDURE — 1123F ACP DISCUSS/DSCN MKR DOCD: CPT | Performed by: UROLOGY

## 2023-07-13 NOTE — PROGRESS NOTES
[facility]  [address] [address]  [phone]    Jessy Garcia  : 1945     HPI   78 y.o., female returns in follow up for a renal mass. Recent CTA on 10/25/22 showed a 1.5cm enhancing LMP anterior renal mass. Also noted in a 4.1cm AAA which is being observed by Dr. Zachery Sánchez. Denies any recent hematuria or flank pain. Cr was 1.11 on 10/25/22. She is on ASA and Plavix for cardiac stents (last placed in  per her report). Mild smoker. CT RMP performed on 23 shows two L renal cysts with the LLP cyst hyperdense but neither enhance. AAA is now 4.4cm.        Past Medical History:   Diagnosis Date    Arthritis     CAD (coronary artery disease)     MI 2017    Essential hypertension 2017    GERD (gastroesophageal reflux disease)     Hyperlipemia, mixed 2017    Other emphysema ([address]) 2017    Other forms of systemic lupus erythematosus ([address]) 2017    PUD (peptic ulcer disease)     Tobacco abuse 2017     Past Surgical History:   Procedure Laterality Date    HYSTERECTOMY (CERVIX STATUS UNKNOWN)      DE UNLISTED PROCEDURE CARDIAC SURGERY      STENT     Current Outpatient Medications   Medication Sig Dispense Refill    metoprolol succinate (TOPROL XL) 50 MG extended release tablet Take 1.5 tablets by mouth daily 45 tablet 5    cyclobenzaprine (FLEXERIL) 5 MG tablet Take 1 tablet by mouth Daily      Multiple Vitamin (MULTIVITAMIN ADULT PO) Take by mouth      Cyanocobalamin (VITAMIN B 12 PO) Take by mouth      aspirin 81 MG chewable tablet Take 1 tablet by mouth daily      vitamin D3 (CHOLECALCIFEROL) 125 MCG (5000 UT) TABS tablet Take 2,000 Units by mouth daily      clopidogrel (PLAVIX) 75 MG tablet Take 1 tablet by mouth daily      fluticasone (FLONASE) 50 MCG/ACT nasal spray 2 sprays by Nasal route daily      fluticasone (FLOVENT HFA) 220 MCG/ACT inhaler Inhale into the lungs      ipratropium-albuterol (DUONEB) 0.5-2.5 (3) MG/3ML SOLN

## 2023-07-16 LAB
BACTERIA SPEC CULT: NORMAL
SERVICE CMNT-IMP: NORMAL

## 2023-10-05 ENCOUNTER — OFFICE VISIT (OUTPATIENT)
Age: 78
End: 2023-10-05
Payer: MEDICARE

## 2023-10-05 VITALS
HEIGHT: 64 IN | HEART RATE: 76 BPM | WEIGHT: 135 LBS | DIASTOLIC BLOOD PRESSURE: 84 MMHG | BODY MASS INDEX: 23.05 KG/M2 | SYSTOLIC BLOOD PRESSURE: 134 MMHG

## 2023-10-05 DIAGNOSIS — I71.43 INFRARENAL ABDOMINAL AORTIC ANEURYSM (AAA) WITHOUT RUPTURE (HCC): ICD-10-CM

## 2023-10-05 DIAGNOSIS — I10 ESSENTIAL HYPERTENSION: Primary | ICD-10-CM

## 2023-10-05 DIAGNOSIS — I25.10 CORONARY ARTERY DISEASE INVOLVING NATIVE CORONARY ARTERY OF NATIVE HEART WITHOUT ANGINA PECTORIS: ICD-10-CM

## 2023-10-05 DIAGNOSIS — Z72.0 TOBACCO ABUSE: ICD-10-CM

## 2023-10-05 DIAGNOSIS — I73.9 PAD (PERIPHERAL ARTERY DISEASE) (HCC): ICD-10-CM

## 2023-10-05 DIAGNOSIS — E78.2 HYPERLIPEMIA, MIXED: ICD-10-CM

## 2023-10-05 PROCEDURE — 3079F DIAST BP 80-89 MM HG: CPT | Performed by: INTERNAL MEDICINE

## 2023-10-05 PROCEDURE — G8428 CUR MEDS NOT DOCUMENT: HCPCS | Performed by: INTERNAL MEDICINE

## 2023-10-05 PROCEDURE — G8484 FLU IMMUNIZE NO ADMIN: HCPCS | Performed by: INTERNAL MEDICINE

## 2023-10-05 PROCEDURE — 3017F COLORECTAL CA SCREEN DOC REV: CPT | Performed by: INTERNAL MEDICINE

## 2023-10-05 PROCEDURE — 3075F SYST BP GE 130 - 139MM HG: CPT | Performed by: INTERNAL MEDICINE

## 2023-10-05 PROCEDURE — 1123F ACP DISCUSS/DSCN MKR DOCD: CPT | Performed by: INTERNAL MEDICINE

## 2023-10-05 PROCEDURE — 1090F PRES/ABSN URINE INCON ASSESS: CPT | Performed by: INTERNAL MEDICINE

## 2023-10-05 PROCEDURE — G8400 PT W/DXA NO RESULTS DOC: HCPCS | Performed by: INTERNAL MEDICINE

## 2023-10-05 PROCEDURE — G8420 CALC BMI NORM PARAMETERS: HCPCS | Performed by: INTERNAL MEDICINE

## 2023-10-05 PROCEDURE — 99214 OFFICE O/P EST MOD 30 MIN: CPT | Performed by: INTERNAL MEDICINE

## 2023-10-05 PROCEDURE — 4004F PT TOBACCO SCREEN RCVD TLK: CPT | Performed by: INTERNAL MEDICINE

## 2023-10-05 ASSESSMENT — ENCOUNTER SYMPTOMS: SHORTNESS OF BREATH: 0

## 2023-10-05 NOTE — PROGRESS NOTES
[id number]  PHONE: [address]  1945      SUBJECTIVE:   Gino Miranda is a 78 y.o. female seen for a follow up visit regarding the following:     Chief Complaint   Patient presents with    Coronary Artery Disease    Hyperlipidemia    Hypertension    Shortness of Breath       HPI:    Gino Miranda presents for routine follow up. Multiple issues addressed. Coronary Artery Disease:  Patient denies any recent angina. Notes compliance with medical therapy. No recent NTG use. No intolerance to anti-platelet therapy. Hypertension:  Ambulatory BP readings have been controlled. Patient reports compliance with medical therapy without side effects. Hyperlipidemia:   Unable to tolerate therapy. AAA: Followed by vascular surgery. Recent CT scan 4.2 x 4.4. Tobacco: Smoking about <1/2 pack a day. Under significant stress socially and financially. Past Medical History, Past Surgical History, Family history, Social History, and Medications were all reviewed with the patient today and updated as necessary.            Current Outpatient Medications:     metoprolol succinate (TOPROL XL) 50 MG extended release tablet, Take 1.5 tablets by mouth daily, Disp: 45 tablet, Rfl: 5    Multiple Vitamin (MULTIVITAMIN ADULT PO), Take by mouth, Disp: , Rfl:     Cyanocobalamin (VITAMIN B 12 PO), Take by mouth, Disp: , Rfl:     aspirin 81 MG chewable tablet, Take 1 tablet by mouth daily, Disp: , Rfl:     vitamin D3 (CHOLECALCIFEROL) 125 MCG (5000 UT) TABS tablet, Take 2,000 Units by mouth daily, Disp: , Rfl:     clopidogrel (PLAVIX) 75 MG tablet, Take 1 tablet by mouth daily, Disp: , Rfl:     fluticasone (FLONASE) 50 MCG/ACT nasal spray, 2 sprays by Nasal route daily, Disp: , Rfl:     fluticasone (FLOVENT HFA) 220 MCG/ACT inhaler, Inhale into the lungs, Disp: , Rfl:     ipratropium-albuterol (DUONEB) 0.5-2.5 (3) MG/3ML

## 2023-10-26 ENCOUNTER — TELEPHONE (OUTPATIENT)
Dept: CARDIOLOGY CLINIC | Age: 78
End: 2023-10-26

## 2023-10-26 NOTE — TELEPHONE ENCOUNTER
Pt is having active chest pain 1 nitro and it goes away and comes back pain under arm tingling in the right arm and bottom of arm is numb

## 2023-10-26 NOTE — TELEPHONE ENCOUNTER
Patient called stating that she is having pain under her left arm. Went to ER on Sunday, everything check out ok. Patient is worried that her aneurysm  Is worse. Patient states that the pain is burning. No arm numbness or tingling. Would like to see Dr. Nick Mcdermott. Appointment scheduled for 11-1-23 at [phone]. Patient informed that if she continues to have pain, go to ER.  Patient voiced understanding//brookeab

## 2023-11-07 ENCOUNTER — OFFICE VISIT (OUTPATIENT)
Dept: VASCULAR SURGERY | Age: 78
End: 2023-11-07
Payer: MEDICARE

## 2023-11-07 VITALS
BODY MASS INDEX: 22.88 KG/M2 | HEIGHT: 64 IN | SYSTOLIC BLOOD PRESSURE: 159 MMHG | OXYGEN SATURATION: 90 % | WEIGHT: 134 LBS | HEART RATE: 76 BPM | DIASTOLIC BLOOD PRESSURE: 86 MMHG

## 2023-11-07 DIAGNOSIS — I73.9 PVD (PERIPHERAL VASCULAR DISEASE) WITH CLAUDICATION (HCC): Primary | ICD-10-CM

## 2023-11-07 PROCEDURE — G8420 CALC BMI NORM PARAMETERS: HCPCS | Performed by: SURGERY

## 2023-11-07 PROCEDURE — 3077F SYST BP >= 140 MM HG: CPT | Performed by: SURGERY

## 2023-11-07 PROCEDURE — 4004F PT TOBACCO SCREEN RCVD TLK: CPT | Performed by: SURGERY

## 2023-11-07 PROCEDURE — 3017F COLORECTAL CA SCREEN DOC REV: CPT | Performed by: SURGERY

## 2023-11-07 PROCEDURE — G8427 DOCREV CUR MEDS BY ELIG CLIN: HCPCS | Performed by: SURGERY

## 2023-11-07 PROCEDURE — G8484 FLU IMMUNIZE NO ADMIN: HCPCS | Performed by: SURGERY

## 2023-11-07 PROCEDURE — 3079F DIAST BP 80-89 MM HG: CPT | Performed by: SURGERY

## 2023-11-07 PROCEDURE — 1090F PRES/ABSN URINE INCON ASSESS: CPT | Performed by: SURGERY

## 2023-11-07 PROCEDURE — G8399 PT W/DXA RESULTS DOCUMENT: HCPCS | Performed by: SURGERY

## 2023-11-07 PROCEDURE — 99213 OFFICE O/P EST LOW 20 MIN: CPT | Performed by: SURGERY

## 2023-11-07 PROCEDURE — 1123F ACP DISCUSS/DSCN MKR DOCD: CPT | Performed by: SURGERY

## 2023-11-07 RX ORDER — PREDNISONE 5 MG/1
TABLET ORAL
COMMUNITY
Start: 2023-10-02

## 2023-11-07 NOTE — PROGRESS NOTES
[address]   [address] FAX: [phone]    Mary Lou Hess  : 1945    Chief Complaint:     History of Present Illness:   Patient follows up today for follow-up duplex study. She has a well known aneurysm that last measured 4.5 cm. CURRENT MEDICATIONS:  Current Outpatient Medications   Medication Sig Dispense Refill    predniSONE (DELTASONE) 5 MG tablet       metoprolol succinate (TOPROL XL) 50 MG extended release tablet Take 1.5 tablets by mouth daily 45 tablet 5    Multiple Vitamin (MULTIVITAMIN ADULT PO) Take by mouth      Cyanocobalamin (VITAMIN B 12 PO) Take by mouth      aspirin 81 MG chewable tablet Take 1 tablet by mouth daily      vitamin D3 (CHOLECALCIFEROL) 125 MCG (5000 UT) TABS tablet Take 2,000 Units by mouth daily      clopidogrel (PLAVIX) 75 MG tablet Take 1 tablet by mouth daily      fluticasone (FLONASE) 50 MCG/ACT nasal spray 2 sprays by Nasal route daily      fluticasone (FLOVENT HFA) 220 MCG/ACT inhaler Inhale into the lungs      ipratropium-albuterol (DUONEB) 0.5-2.5 (3) MG/3ML SOLN nebulizer solution Inhale 3 mLs into the lungs as needed      albuterol-ipratropium (COMBIVENT RESPIMAT)  MCG/ACT AERS inhaler Inhale 1 puff into the lungs every 6 hours      methadone (DOLOPHINE) 10 MG tablet Take 1 tablet by mouth 2 times daily. nitroGLYCERIN (NITROSTAT) 0.4 MG SL tablet Place 1 sl under the tongue q 5 min prn cp, max 3 sl in a 15-min time period. Call 911 if no relief after the 3rd sl.      pantoprazole (PROTONIX) 40 MG tablet Take 1 tablet by mouth daily       No current facility-administered medications for this visit.        Past Medical History:   Diagnosis Date    Arthritis     CAD (coronary artery disease)     MI 2017    Essential hypertension 2017    GERD (gastroesophageal reflux disease)     Hyperlipemia, mixed 2017    Other emphysema ([address]) 2017    Other forms of systemic lupus erythematosus

## 2023-11-19 ENCOUNTER — APPOINTMENT (OUTPATIENT)
Dept: CT IMAGING | Age: 78
End: 2023-11-19
Payer: MEDICARE

## 2023-11-19 ENCOUNTER — HOSPITAL ENCOUNTER (EMERGENCY)
Age: 78
Discharge: HOME | End: 2023-11-19
Attending: EMERGENCY MEDICINE
Payer: MEDICARE

## 2023-11-19 VITALS
DIASTOLIC BLOOD PRESSURE: 99 MMHG | RESPIRATION RATE: 12 BRPM | TEMPERATURE: 97.8 F | HEART RATE: 83 BPM | OXYGEN SATURATION: 98 % | BODY MASS INDEX: 22.2 KG/M2 | HEIGHT: 64 IN | WEIGHT: 130 LBS | SYSTOLIC BLOOD PRESSURE: 167 MMHG

## 2023-11-19 DIAGNOSIS — R10.32 ABDOMINAL PAIN, LEFT LOWER QUADRANT: Primary | ICD-10-CM

## 2023-11-19 DIAGNOSIS — N30.00 ACUTE CYSTITIS WITHOUT HEMATURIA: ICD-10-CM

## 2023-11-19 LAB
ALBUMIN SERPL-MCNC: 3.4 G/DL (ref 3.2–4.6)
ALBUMIN/GLOB SERPL: 0.9 (ref 0.4–1.6)
ALP SERPL-CCNC: 60 U/L (ref 50–136)
ALT SERPL-CCNC: 15 U/L (ref 12–65)
ANION GAP SERPL CALC-SCNC: 3 MMOL/L (ref 2–11)
APPEARANCE UR: CLEAR
AST SERPL-CCNC: 23 U/L (ref 15–37)
BACTERIA URNS QL MICRO: NEGATIVE /HPF
BASOPHILS # BLD: 0.1 K/UL (ref 0–0.2)
BASOPHILS NFR BLD: 1 % (ref 0–2)
BILIRUB SERPL-MCNC: 0.2 MG/DL (ref 0.2–1.1)
BILIRUB UR QL: NEGATIVE
BUN SERPL-MCNC: 15 MG/DL (ref 8–23)
CALCIUM SERPL-MCNC: 9 MG/DL (ref 8.3–10.4)
CASTS URNS QL MICRO: ABNORMAL /LPF
CHLORIDE SERPL-SCNC: 112 MMOL/L (ref 101–110)
CO2 SERPL-SCNC: 27 MMOL/L (ref 21–32)
COLOR UR: ABNORMAL
CREAT SERPL-MCNC: 1.3 MG/DL (ref 0.6–1)
DIFFERENTIAL METHOD BLD: ABNORMAL
EOSINOPHIL # BLD: 0.1 K/UL (ref 0–0.8)
EOSINOPHIL NFR BLD: 1 % (ref 0.5–7.8)
EPI CELLS #/AREA URNS HPF: ABNORMAL /HPF
ERYTHROCYTE [DISTWIDTH] IN BLOOD BY AUTOMATED COUNT: 20.5 % (ref 11.9–14.6)
GLOBULIN SER CALC-MCNC: 4 G/DL (ref 2.8–4.5)
GLUCOSE SERPL-MCNC: 86 MG/DL (ref 65–100)
GLUCOSE UR STRIP.AUTO-MCNC: NEGATIVE MG/DL
HCT VFR BLD AUTO: 38.7 % (ref 35.8–46.3)
HGB BLD-MCNC: 11.8 G/DL (ref 11.7–15.4)
HGB UR QL STRIP: NEGATIVE
IMM GRANULOCYTES # BLD AUTO: 0 K/UL (ref 0–0.5)
IMM GRANULOCYTES NFR BLD AUTO: 0 % (ref 0–5)
KETONES UR QL STRIP.AUTO: NEGATIVE MG/DL
LACTATE SERPL-SCNC: 0.5 MMOL/L (ref 0.4–2)
LEUKOCYTE ESTERASE UR QL STRIP.AUTO: ABNORMAL
LIPASE SERPL-CCNC: 87 U/L (ref 73–393)
LYMPHOCYTES # BLD: 3 K/UL (ref 0.5–4.6)
LYMPHOCYTES NFR BLD: 31 % (ref 13–44)
MCH RBC QN AUTO: 24.2 PG (ref 26.1–32.9)
MCHC RBC AUTO-ENTMCNC: 30.5 G/DL (ref 31.4–35)
MCV RBC AUTO: 79.3 FL (ref 82–102)
MONOCYTES # BLD: 1 K/UL (ref 0.1–1.3)
MONOCYTES NFR BLD: 10 % (ref 4–12)
NEUTS SEG # BLD: 5.4 K/UL (ref 1.7–8.2)
NEUTS SEG NFR BLD: 57 % (ref 43–78)
NITRITE UR QL STRIP.AUTO: NEGATIVE
NRBC # BLD: 0 K/UL (ref 0–0.2)
PH UR STRIP: 5 (ref 5–9)
PLATELET # BLD AUTO: 255 K/UL (ref 150–450)
PMV BLD AUTO: 9.8 FL (ref 9.4–12.3)
POTASSIUM SERPL-SCNC: 4.1 MMOL/L (ref 3.5–5.1)
PROT SERPL-MCNC: 7.4 G/DL (ref 6.3–8.2)
PROT UR STRIP-MCNC: NEGATIVE MG/DL
RBC # BLD AUTO: 4.88 M/UL (ref 4.05–5.2)
RBC #/AREA URNS HPF: ABNORMAL /HPF
SODIUM SERPL-SCNC: 142 MMOL/L (ref 133–143)
SP GR UR REFRACTOMETRY: 1.01 (ref 1–1.02)
UROBILINOGEN UR QL STRIP.AUTO: 0.2 EU/DL (ref 0.2–1)
WBC # BLD AUTO: 9.5 K/UL (ref 4.3–11.1)
WBC URNS QL MICRO: ABNORMAL /HPF

## 2023-11-19 PROCEDURE — 6360000002 HC RX W HCPCS: Performed by: EMERGENCY MEDICINE

## 2023-11-19 PROCEDURE — 85025 COMPLETE CBC W/AUTO DIFF WBC: CPT

## 2023-11-19 PROCEDURE — 6360000004 HC RX CONTRAST MEDICATION: Performed by: EMERGENCY MEDICINE

## 2023-11-19 PROCEDURE — 81001 URINALYSIS AUTO W/SCOPE: CPT

## 2023-11-19 PROCEDURE — 99285 EMERGENCY DEPT VISIT HI MDM: CPT

## 2023-11-19 PROCEDURE — 87086 URINE CULTURE/COLONY COUNT: CPT

## 2023-11-19 PROCEDURE — 96361 HYDRATE IV INFUSION ADD-ON: CPT

## 2023-11-19 PROCEDURE — 2580000003 HC RX 258: Performed by: EMERGENCY MEDICINE

## 2023-11-19 PROCEDURE — 80053 COMPREHEN METABOLIC PANEL: CPT

## 2023-11-19 PROCEDURE — 96365 THER/PROPH/DIAG IV INF INIT: CPT

## 2023-11-19 PROCEDURE — 96375 TX/PRO/DX INJ NEW DRUG ADDON: CPT

## 2023-11-19 PROCEDURE — 74177 CT ABD & PELVIS W/CONTRAST: CPT

## 2023-11-19 PROCEDURE — 83690 ASSAY OF LIPASE: CPT

## 2023-11-19 PROCEDURE — 83605 ASSAY OF LACTIC ACID: CPT

## 2023-11-19 RX ORDER — CEPHALEXIN 500 MG/1
500 CAPSULE ORAL 2 TIMES DAILY
Qty: 14 CAPSULE | Refills: 0 | Status: SHIPPED | OUTPATIENT
Start: 2023-11-19 | End: 2023-11-26

## 2023-11-19 RX ORDER — 0.9 % SODIUM CHLORIDE 0.9 %
1000 INTRAVENOUS SOLUTION INTRAVENOUS ONCE
Status: COMPLETED | OUTPATIENT
Start: 2023-11-19 | End: 2023-11-19

## 2023-11-19 RX ORDER — MORPHINE SULFATE 4 MG/ML
4 INJECTION, SOLUTION INTRAMUSCULAR; INTRAVENOUS
Status: COMPLETED | OUTPATIENT
Start: 2023-11-19 | End: 2023-11-19

## 2023-11-19 RX ORDER — ONDANSETRON 2 MG/ML
4 INJECTION INTRAMUSCULAR; INTRAVENOUS
Status: COMPLETED | OUTPATIENT
Start: 2023-11-19 | End: 2023-11-19

## 2023-11-19 RX ADMIN — MORPHINE SULFATE 4 MG: 4 INJECTION INTRAVENOUS at 20:08

## 2023-11-19 RX ADMIN — CEFTRIAXONE 1000 MG: 1 INJECTION, POWDER, FOR SOLUTION INTRAMUSCULAR; INTRAVENOUS at 21:10

## 2023-11-19 RX ADMIN — SODIUM CHLORIDE 1000 ML: 9 INJECTION, SOLUTION INTRAVENOUS at 20:09

## 2023-11-19 RX ADMIN — ONDANSETRON 4 MG: 2 INJECTION INTRAMUSCULAR; INTRAVENOUS at 20:07

## 2023-11-19 RX ADMIN — IOPAMIDOL 100 ML: 755 INJECTION, SOLUTION INTRAVENOUS at 19:38

## 2023-11-19 ASSESSMENT — PAIN SCALES - GENERAL: PAINLEVEL_OUTOF10: 8

## 2023-11-19 ASSESSMENT — ENCOUNTER SYMPTOMS
COUGH: 0
RHINORRHEA: 0
BACK PAIN: 0
SHORTNESS OF BREATH: 0
RECTAL PAIN: 0
NAUSEA: 0
VOMITING: 0
DIARRHEA: 0
ABDOMINAL PAIN: 1

## 2023-11-19 ASSESSMENT — PAIN DESCRIPTION - LOCATION: LOCATION: ABDOMEN

## 2023-11-19 ASSESSMENT — PAIN - FUNCTIONAL ASSESSMENT: PAIN_FUNCTIONAL_ASSESSMENT: 0-10

## 2023-11-19 NOTE — ED PROVIDER NOTES
above.       Manuela Carreon M.D.    11/19/2023 8:04:00 PM                        Voice dictation software was used during the making of this note. This software is not perfect and grammatical and other typographical errors may be present. This note has not been completely proofread for errors.      Lars Ray MD  11/19/23 2321

## 2023-11-19 NOTE — ED TRIAGE NOTES
Pt 77 y/o female arrives via [facility] ems with a complaint of abdominal pain that gets worse when pt tries to eat or drink. Pt states she has a hx of a abdominal aortic aneurysm that she was informed of approximately 2 years ago. Pt denies any nausea, vomiting, diarrhea at this time.

## 2023-11-20 NOTE — DISCHARGE INSTRUCTIONS
Take antibiotic as prescribed. Schedule close follow-up with primary care physician, vascular surgery. Return to ED if symptoms worsen or progress in any way.

## 2023-11-22 LAB
BACTERIA SPEC CULT: NORMAL
SERVICE CMNT-IMP: NORMAL

## 2024-01-08 ENCOUNTER — TELEPHONE (OUTPATIENT)
Age: 79
End: 2024-01-08

## 2024-01-08 NOTE — TELEPHONE ENCOUNTER
Pt been having problems breathing. Has been to [facility], Sunday before last.  Dr Garry Mitchell with [facility] scheduled for surgery then cancelled. Has an 4.7 aortic embolism and getting worse.    Sometimes she quits breathing and now her primary scheduled her an Echo in [place] with   Dr Joseph and wants to do an echo on 1/16/24 and then to follow up 1/20/24.      Pt cannot drive and needs guidance on if she should get test in [place] or with us.

## 2024-01-08 NOTE — TELEPHONE ENCOUNTER
Pt states she's been \"real sick\" and her PCP wants her to see Dr. Joseph in [place]. Scheduled for CPFT, echo, and \"simple stress test\" with Dr. Joseph. Wants to make sure it's ok to get echo with Dr. Joseph. States has had to call EMS once a week recently for SOB. Recommend pt keep upcoming appts with Dr. Joseph to rule pulmonary cause in or out. If SOB not determined to be caused by her pulmonary concerns, call back and we would move up her appt with Dr. Nichole. Verb understanding.

## 2024-03-25 ENCOUNTER — TELEPHONE (OUTPATIENT)
Dept: VASCULAR SURGERY | Age: 79
End: 2024-03-25

## 2024-03-25 NOTE — TELEPHONE ENCOUNTER
Pt lmvm nurse line stating her PCP did x-ray on R-leg which showed bld hardening     **per Franchesca NP she reviewed her chart which we already knew this, keep appt unless having any pain    -lmvm of this for patient

## 2024-04-15 ENCOUNTER — OFFICE VISIT (OUTPATIENT)
Age: 79
End: 2024-04-15
Payer: MEDICARE

## 2024-04-15 VITALS
SYSTOLIC BLOOD PRESSURE: 130 MMHG | DIASTOLIC BLOOD PRESSURE: 88 MMHG | BODY MASS INDEX: 22.64 KG/M2 | WEIGHT: 132.6 LBS | HEIGHT: 64 IN | HEART RATE: 85 BPM

## 2024-04-15 DIAGNOSIS — I10 ESSENTIAL HYPERTENSION: Primary | ICD-10-CM

## 2024-04-15 DIAGNOSIS — J43.8 OTHER EMPHYSEMA (HCC): ICD-10-CM

## 2024-04-15 DIAGNOSIS — Z72.0 TOBACCO ABUSE: ICD-10-CM

## 2024-04-15 DIAGNOSIS — I25.10 CORONARY ARTERY DISEASE INVOLVING NATIVE CORONARY ARTERY OF NATIVE HEART WITHOUT ANGINA PECTORIS: ICD-10-CM

## 2024-04-15 DIAGNOSIS — I71.43 INFRARENAL ABDOMINAL AORTIC ANEURYSM (AAA) WITHOUT RUPTURE (HCC): ICD-10-CM

## 2024-04-15 DIAGNOSIS — E78.2 HYPERLIPEMIA, MIXED: ICD-10-CM

## 2024-04-15 PROBLEM — Z98.61 POST PTCA: Status: RESOLVED | Noted: 2017-11-29 | Resolved: 2024-04-15

## 2024-04-15 PROCEDURE — 93000 ELECTROCARDIOGRAM COMPLETE: CPT | Performed by: INTERNAL MEDICINE

## 2024-04-15 PROCEDURE — 3023F SPIROM DOC REV: CPT | Performed by: INTERNAL MEDICINE

## 2024-04-15 PROCEDURE — 1090F PRES/ABSN URINE INCON ASSESS: CPT | Performed by: INTERNAL MEDICINE

## 2024-04-15 PROCEDURE — 3079F DIAST BP 80-89 MM HG: CPT | Performed by: INTERNAL MEDICINE

## 2024-04-15 PROCEDURE — 3075F SYST BP GE 130 - 139MM HG: CPT | Performed by: INTERNAL MEDICINE

## 2024-04-15 PROCEDURE — 1123F ACP DISCUSS/DSCN MKR DOCD: CPT | Performed by: INTERNAL MEDICINE

## 2024-04-15 PROCEDURE — G8427 DOCREV CUR MEDS BY ELIG CLIN: HCPCS | Performed by: INTERNAL MEDICINE

## 2024-04-15 PROCEDURE — G8399 PT W/DXA RESULTS DOCUMENT: HCPCS | Performed by: INTERNAL MEDICINE

## 2024-04-15 PROCEDURE — 4004F PT TOBACCO SCREEN RCVD TLK: CPT | Performed by: INTERNAL MEDICINE

## 2024-04-15 PROCEDURE — G8420 CALC BMI NORM PARAMETERS: HCPCS | Performed by: INTERNAL MEDICINE

## 2024-04-15 PROCEDURE — 99214 OFFICE O/P EST MOD 30 MIN: CPT | Performed by: INTERNAL MEDICINE

## 2024-04-15 ASSESSMENT — ENCOUNTER SYMPTOMS: SHORTNESS OF BREATH: 1

## 2024-04-15 NOTE — PROGRESS NOTES
[facility] CARDIOLOGY, [address], [address]  [place] [address]  PHONE: [phone]    Jodi Werner  1945      SUBJECTIVE:   Jodi Werner is a 78 y.o. female seen for a follow up visit regarding the following:     Chief Complaint   Patient presents with    Coronary Artery Disease    Hypertension       HPI:    Jodi Werner presents for routine follow up. Multiple issues addressed.     Coronary Artery Disease:  No chest pain.  On ASA and Plavix.     Hypertension:  Ambulatory BP readings have been controlled.  Patient reports compliance with medical therapy without side effects.      Hyperlipidemia:  Unable to tolerate therapy.     AAA: Followed by Dr. Garry Mitchell.  Last visit in November.  Note reviewed.  Noted at this time to have a 4.7 cm infrarenal abdominal aortic aneurysm.  Plan to recheck duplex in 6 months.    Tobacco use:  Still smoking 2 cigarettes a day.     COPD:  Seen Pulmonology in [place].  Told had \"bad lungs\".  On 4lpm oxygen.  CT angiogram:  SEVERE EMPHYSEMATOUS CHANGES THROUGHOUT BOTH LUNGS AND SCATTERED STABLE 2 MM   PULMONARY NODULES IN THE LEFT LUNG BASE. NO ACUTE INFILTRATE.   Had echo at [facility]:  Echo (1/16/24):  EF 60-65%.  AVSC.  Mild MR/TR.  RVSP 50.         Past Medical History, Past Surgical History, Family history, Social History, and Medications were all reviewed with the patient today and updated as necessary.           Current Outpatient Medications:     predniSONE (DELTASONE) 5 MG tablet, , Disp: , Rfl:     metoprolol succinate (TOPROL XL) 50 MG extended release tablet, Take 1.5 tablets by mouth daily (Patient taking differently: Take 1 tablet by mouth daily), Disp: 45 tablet, Rfl: 5    Multiple Vitamin (MULTIVITAMIN ADULT PO), Take by mouth, Disp: , Rfl:     Cyanocobalamin (VITAMIN B 12 PO), Take by mouth, Disp: , Rfl:     aspirin 81 MG chewable tablet, Take 1 tablet by mouth daily, Disp: , Rfl:     vitamin D3 (CHOLECALCIFEROL) 125 MCG (5000 UT)

## 2024-04-22 ENCOUNTER — TELEPHONE (OUTPATIENT)
Age: 79
End: 2024-04-22

## 2024-04-22 RX ORDER — METOPROLOL SUCCINATE 50 MG/1
75 TABLET, EXTENDED RELEASE ORAL DAILY
Qty: 135 TABLET | Refills: 3 | Status: SHIPPED | OUTPATIENT
Start: 2024-04-22

## 2024-04-22 NOTE — TELEPHONE ENCOUNTER
Medication verified and eScribed to preferred pharmJostin Tovar.  Metoprolol succ 50mg  #135  1 1/2 tab QD w/3RF's.

## 2024-04-22 NOTE — TELEPHONE ENCOUNTER
MEDICATION REFILL REQUEST      Name of Medication:  Metoprolol Succinate  Dose:  50 mg  Frequency:  1 and 1/2 pill a day  Quantity:  135  Days' supply:  90 with refills    Pharmacy Name/Location:  [facility] [phone]    Pt is asking if this comes in a 75 mg because she is having to cut in half and she is wasting them  Please call pt to let her know what mg are available to be called in .

## 2024-05-14 ENCOUNTER — OFFICE VISIT (OUTPATIENT)
Dept: VASCULAR SURGERY | Age: 79
End: 2024-05-14
Payer: MEDICARE

## 2024-05-14 ENCOUNTER — HOSPITAL ENCOUNTER (OUTPATIENT)
Dept: CT IMAGING | Age: 79
Discharge: HOME | End: 2024-05-17
Attending: SURGERY
Payer: MEDICARE

## 2024-05-14 VITALS
BODY MASS INDEX: 22.88 KG/M2 | HEART RATE: 91 BPM | SYSTOLIC BLOOD PRESSURE: 166 MMHG | HEIGHT: 64 IN | DIASTOLIC BLOOD PRESSURE: 92 MMHG | WEIGHT: 134 LBS | OXYGEN SATURATION: 85 %

## 2024-05-14 DIAGNOSIS — I71.43 INFRARENAL ABDOMINAL AORTIC ANEURYSM (AAA) WITHOUT RUPTURE (HCC): ICD-10-CM

## 2024-05-14 DIAGNOSIS — I71.43 INFRARENAL ABDOMINAL AORTIC ANEURYSM (AAA) WITHOUT RUPTURE (HCC): Primary | ICD-10-CM

## 2024-05-14 LAB — CREAT BLD-MCNC: 1.02 MG/DL (ref 0.8–1.5)

## 2024-05-14 PROCEDURE — 99213 OFFICE O/P EST LOW 20 MIN: CPT | Performed by: SURGERY

## 2024-05-14 PROCEDURE — 1090F PRES/ABSN URINE INCON ASSESS: CPT | Performed by: SURGERY

## 2024-05-14 PROCEDURE — G8427 DOCREV CUR MEDS BY ELIG CLIN: HCPCS | Performed by: SURGERY

## 2024-05-14 PROCEDURE — 4004F PT TOBACCO SCREEN RCVD TLK: CPT | Performed by: SURGERY

## 2024-05-14 PROCEDURE — 1123F ACP DISCUSS/DSCN MKR DOCD: CPT | Performed by: SURGERY

## 2024-05-14 PROCEDURE — 3077F SYST BP >= 140 MM HG: CPT | Performed by: SURGERY

## 2024-05-14 PROCEDURE — G8420 CALC BMI NORM PARAMETERS: HCPCS | Performed by: SURGERY

## 2024-05-14 PROCEDURE — 74174 CTA ABD&PLVS W/CONTRAST: CPT

## 2024-05-14 PROCEDURE — G8399 PT W/DXA RESULTS DOCUMENT: HCPCS | Performed by: SURGERY

## 2024-05-14 PROCEDURE — 82565 ASSAY OF CREATININE: CPT

## 2024-05-14 PROCEDURE — 6360000004 HC RX CONTRAST MEDICATION: Performed by: SURGERY

## 2024-05-14 PROCEDURE — 3080F DIAST BP >= 90 MM HG: CPT | Performed by: SURGERY

## 2024-05-14 RX ADMIN — IOPAMIDOL 100 ML: 755 INJECTION, SOLUTION INTRAVENOUS at 12:30

## 2024-05-14 NOTE — PROGRESS NOTES
[address] [address]  [phone] FAX: [phone]    Jodi Werner  : 1945    Chief Complaint:     History of Present Illness:   Patient follows up today for for surveillance ultrasound.  Patient with a known aneurysm.  She does complain of vague abdominal pain not sure is related to her ulcer.    CURRENT MEDICATIONS:  Current Outpatient Medications   Medication Sig Dispense Refill    metoprolol succinate (TOPROL XL) 50 MG extended release tablet Take 1.5 tablets by mouth daily 135 tablet 3    predniSONE (DELTASONE) 5 MG tablet       Multiple Vitamin (MULTIVITAMIN ADULT PO) Take by mouth      Cyanocobalamin (VITAMIN B 12 PO) Take by mouth      aspirin 81 MG chewable tablet Take 1 tablet by mouth daily      vitamin D3 (CHOLECALCIFEROL) 125 MCG (5000 UT) TABS tablet Take 2,000 Units by mouth daily      clopidogrel (PLAVIX) 75 MG tablet Take 1 tablet by mouth daily      fluticasone (FLONASE) 50 MCG/ACT nasal spray 2 sprays by Nasal route daily      fluticasone (FLOVENT HFA) 220 MCG/ACT inhaler Inhale into the lungs      ipratropium-albuterol (DUONEB) 0.5-2.5 (3) MG/3ML SOLN nebulizer solution Inhale 3 mLs into the lungs as needed      albuterol-ipratropium (COMBIVENT RESPIMAT)  MCG/ACT AERS inhaler Inhale 1 puff into the lungs every 6 hours      methadone (DOLOPHINE) 10 MG tablet Take 1 tablet by mouth daily.      nitroGLYCERIN (NITROSTAT) 0.4 MG SL tablet Place 1 sl under the tongue q 5 min prn cp, max 3 sl in a 15-min time period. Call 911 if no relief after the 3rd sl.      pantoprazole (PROTONIX) 40 MG tablet Take 1 tablet by mouth daily       No current facility-administered medications for this visit.       Past Medical History:   Diagnosis Date    Arthritis     CAD (coronary artery disease)     MI 2017    Essential hypertension 2017    GERD (gastroesophageal reflux disease)     Hyperlipemia, mixed 2017    Other emphysema (HCC)

## 2024-05-17 ENCOUNTER — CLINICAL DOCUMENTATION (OUTPATIENT)
Dept: VASCULAR SURGERY | Age: 79
End: 2024-05-17

## 2024-05-17 NOTE — PROGRESS NOTES
Called to discuss patient's CTA findings.  She has a 5.1 cm infrarenal abdominal aortic aneurysm.  With amount of thrombus at the neck she would need to have this thing done fenestrated.  With her significant lung comorbidities ideally would not recommend any type of surgery intervention until is 5.5 cm.  I will follow-up in 6 months with a duplex study.

## 2024-07-12 ENCOUNTER — TELEPHONE (OUTPATIENT)
Dept: UROLOGY | Age: 79
End: 2024-07-12

## 2024-07-12 NOTE — TELEPHONE ENCOUNTER
Called patient and left a voicemail letting her know that her appointment has been cancelled due to Doctor Gerard being out of the office Monday July 15th. I went ahead and rescheduled her for September 9th at 9:45.

## 2024-08-09 ENCOUNTER — HOSPITAL ENCOUNTER (OUTPATIENT)
Dept: CT IMAGING | Age: 79
End: 2024-08-09
Attending: UROLOGY
Payer: MEDICARE

## 2024-08-09 DIAGNOSIS — N28.89 RENAL MASS: ICD-10-CM

## 2024-08-09 LAB — CREAT BLD-MCNC: 1.1 MG/DL (ref 0.8–1.5)

## 2024-08-09 PROCEDURE — 6360000004 HC RX CONTRAST MEDICATION: Performed by: UROLOGY

## 2024-08-09 PROCEDURE — 82565 ASSAY OF CREATININE: CPT

## 2024-08-09 PROCEDURE — 74170 CT ABD WO CNTRST FLWD CNTRST: CPT

## 2024-08-09 RX ADMIN — IOPAMIDOL 100 ML: 755 INJECTION, SOLUTION INTRAVENOUS at 14:12

## 2024-08-28 ENCOUNTER — TELEPHONE (OUTPATIENT)
Dept: VASCULAR SURGERY | Age: 79
End: 2024-08-28

## 2024-08-28 NOTE — TELEPHONE ENCOUNTER
PCP ([phone]) called office to inform Dr Mitchell that patient is complaining of abdomen pain for the last week.  Hemoglobin lab is normal at 11.9.  Per Dr Mitchell, patient can come in to office to discuss possible Fenestrated Graft with another provider.    Called patient - she will come in on 8/30 to discuss.

## 2024-08-30 ENCOUNTER — OFFICE VISIT (OUTPATIENT)
Dept: VASCULAR SURGERY | Age: 79
End: 2024-08-30
Payer: MEDICARE

## 2024-08-30 VITALS
OXYGEN SATURATION: 90 % | DIASTOLIC BLOOD PRESSURE: 91 MMHG | WEIGHT: 119 LBS | SYSTOLIC BLOOD PRESSURE: 177 MMHG | BODY MASS INDEX: 20.32 KG/M2 | HEART RATE: 72 BPM | HEIGHT: 64 IN

## 2024-08-30 DIAGNOSIS — I73.9 PVD (PERIPHERAL VASCULAR DISEASE) WITH CLAUDICATION (HCC): Primary | ICD-10-CM

## 2024-08-30 PROCEDURE — G8399 PT W/DXA RESULTS DOCUMENT: HCPCS | Performed by: SURGERY

## 2024-08-30 PROCEDURE — G8420 CALC BMI NORM PARAMETERS: HCPCS | Performed by: SURGERY

## 2024-08-30 PROCEDURE — 99213 OFFICE O/P EST LOW 20 MIN: CPT | Performed by: SURGERY

## 2024-08-30 PROCEDURE — 4004F PT TOBACCO SCREEN RCVD TLK: CPT | Performed by: SURGERY

## 2024-08-30 PROCEDURE — 1123F ACP DISCUSS/DSCN MKR DOCD: CPT | Performed by: SURGERY

## 2024-08-30 PROCEDURE — 3080F DIAST BP >= 90 MM HG: CPT | Performed by: SURGERY

## 2024-08-30 PROCEDURE — G8427 DOCREV CUR MEDS BY ELIG CLIN: HCPCS | Performed by: SURGERY

## 2024-08-30 PROCEDURE — 1090F PRES/ABSN URINE INCON ASSESS: CPT | Performed by: SURGERY

## 2024-08-30 PROCEDURE — 3077F SYST BP >= 140 MM HG: CPT | Performed by: SURGERY

## 2024-08-30 NOTE — PROGRESS NOTES
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  She denies any unexplained weight loss or abdominal pain.    CURRENT MEDICATIONS:  Current Outpatient Medications   Medication Sig Dispense Refill    OXYGEN Inhale into the lungs      metoprolol succinate (TOPROL XL) 50 MG extended release tablet Take 1.5 tablets by mouth daily 135 tablet 3    predniSONE (DELTASONE) 5 MG tablet       Multiple Vitamin (MULTIVITAMIN ADULT PO) Take by mouth      Cyanocobalamin (VITAMIN B 12 PO) Take by mouth      aspirin 81 MG chewable tablet Take 1 tablet by mouth daily      vitamin D3 (CHOLECALCIFEROL) 125 MCG (5000 UT) TABS tablet Take 2,000 Units by mouth daily      clopidogrel (PLAVIX) 75 MG tablet Take 1 tablet by mouth daily      fluticasone (FLONASE) 50 MCG/ACT nasal spray 2 sprays by Nasal route daily      fluticasone (FLOVENT HFA) 220 MCG/ACT inhaler Inhale into the lungs      ipratropium-albuterol (DUONEB) 0.5-2.5 (3) MG/3ML SOLN nebulizer solution Inhale 3 mLs into the lungs as needed      albuterol-ipratropium (COMBIVENT RESPIMAT)  MCG/ACT AERS inhaler Inhale 1 puff into the lungs every 6 hours      methadone (DOLOPHINE) 10 MG tablet Take 1 tablet by mouth daily.      nitroGLYCERIN (NITROSTAT) 0.4 MG SL tablet Place 1 sl under the tongue q 5 min prn cp, max 3 sl in a 15-min time period. Call 911 if no relief after the 3rd sl.      pantoprazole (PROTONIX) 40 MG tablet Take 1 tablet by mouth daily       No current facility-administered medications for this visit.       Past Medical History:   Diagnosis Date    Arthritis     CAD (coronary artery disease)     MI 2017    Essential hypertension 2017    GERD (gastroesophageal reflux disease)

## 2025-03-19 ENCOUNTER — OFFICE VISIT (OUTPATIENT)
Dept: VASCULAR SURGERY | Age: 80
End: 2025-03-19
Payer: MEDICARE

## 2025-03-19 VITALS
WEIGHT: 104 LBS | DIASTOLIC BLOOD PRESSURE: 92 MMHG | BODY MASS INDEX: 17.75 KG/M2 | OXYGEN SATURATION: 91 % | HEIGHT: 64 IN | SYSTOLIC BLOOD PRESSURE: 165 MMHG | HEART RATE: 108 BPM

## 2025-03-19 DIAGNOSIS — Z72.0 TOBACCO ABUSE: ICD-10-CM

## 2025-03-19 DIAGNOSIS — I71.40 ABDOMINAL AORTIC ANEURYSM (AAA) WITHOUT RUPTURE, UNSPECIFIED PART: Primary | ICD-10-CM

## 2025-03-19 PROCEDURE — G8427 DOCREV CUR MEDS BY ELIG CLIN: HCPCS | Performed by: NURSE PRACTITIONER

## 2025-03-19 PROCEDURE — G8419 CALC BMI OUT NRM PARAM NOF/U: HCPCS | Performed by: NURSE PRACTITIONER

## 2025-03-19 PROCEDURE — 3080F DIAST BP >= 90 MM HG: CPT | Performed by: NURSE PRACTITIONER

## 2025-03-19 PROCEDURE — 4004F PT TOBACCO SCREEN RCVD TLK: CPT | Performed by: NURSE PRACTITIONER

## 2025-03-19 PROCEDURE — 1123F ACP DISCUSS/DSCN MKR DOCD: CPT | Performed by: NURSE PRACTITIONER

## 2025-03-19 PROCEDURE — 1159F MED LIST DOCD IN RCRD: CPT | Performed by: NURSE PRACTITIONER

## 2025-03-19 PROCEDURE — 3077F SYST BP >= 140 MM HG: CPT | Performed by: NURSE PRACTITIONER

## 2025-03-19 PROCEDURE — G8399 PT W/DXA RESULTS DOCUMENT: HCPCS | Performed by: NURSE PRACTITIONER

## 2025-03-19 PROCEDURE — 1160F RVW MEDS BY RX/DR IN RCRD: CPT | Performed by: NURSE PRACTITIONER

## 2025-03-19 PROCEDURE — G2211 COMPLEX E/M VISIT ADD ON: HCPCS | Performed by: NURSE PRACTITIONER

## 2025-03-19 PROCEDURE — 99213 OFFICE O/P EST LOW 20 MIN: CPT | Performed by: NURSE PRACTITIONER

## 2025-03-19 PROCEDURE — 1090F PRES/ABSN URINE INCON ASSESS: CPT | Performed by: NURSE PRACTITIONER

## 2025-03-19 NOTE — PROGRESS NOTES
Left Upper Arm, Patient Position: Supine, BP Cuff Size: Medium Adult)   Pulse (!) 108   Ht 1.626 m (5' 4\")   Wt 47.2 kg (104 lb)   SpO2 91%   BMI 17.85 kg/m²       GENERAL: Well developed, well nourished, in NAD  HEAD/NECK: normocephalic, atraumatic, neck supple  HEART: Regular rate and rhythm  ABDOMEN: soft, nontender, nondistended   MUSCULOSKELETAL: normal gait  NEURO: sensation and strength grossly intact and symmetrical  PSYCH: alert and oriented to person, place and time      Assessment/Plan: Patient is a 79 year old female who follows up for her aorta duplex study. No new, abdominal pain or back pain.Duplex study remains stable at 5 cm. If her aneurysm enlarges to over 5 1/2 cm she would require a fenestrated graft that would need to be performed at an outside facility. She would like to continue with surveillance ultrasounds here. Will refer her to [facility] if aneurysm enlarges. She is instructed to work on complete smoking cessation and she needs to present to the ER with any new abdominal pain or back pain in the interim.         Franchesca Conn, APRN - CNP    20 minutes of time was spent on this encounter including chart review, assessment and evaluation